# Patient Record
Sex: MALE | Race: WHITE | NOT HISPANIC OR LATINO | Employment: FULL TIME | URBAN - METROPOLITAN AREA
[De-identification: names, ages, dates, MRNs, and addresses within clinical notes are randomized per-mention and may not be internally consistent; named-entity substitution may affect disease eponyms.]

---

## 2017-01-24 ENCOUNTER — ALLSCRIPTS OFFICE VISIT (OUTPATIENT)
Dept: OTHER | Facility: OTHER | Age: 42
End: 2017-01-24

## 2017-01-24 LAB
BILIRUB UR QL STRIP: NEGATIVE
CLARITY UR: NORMAL
COLOR UR: YELLOW
GLUCOSE (HISTORICAL): NEGATIVE
HGB UR QL STRIP.AUTO: NEGATIVE
KETONES UR STRIP-MCNC: NEGATIVE MG/DL
LEUKOCYTE ESTERASE UR QL STRIP: NEGATIVE
NITRITE UR QL STRIP: NEGATIVE
PH UR STRIP.AUTO: 7.5 [PH]
PROT UR STRIP-MCNC: NEGATIVE MG/DL
SP GR UR STRIP.AUTO: 1.02
UROBILINOGEN UR QL STRIP.AUTO: 0.2

## 2017-03-17 ENCOUNTER — GENERIC CONVERSION - ENCOUNTER (OUTPATIENT)
Dept: OTHER | Facility: OTHER | Age: 42
End: 2017-03-17

## 2017-06-15 ENCOUNTER — ALLSCRIPTS OFFICE VISIT (OUTPATIENT)
Dept: OTHER | Facility: OTHER | Age: 42
End: 2017-06-15

## 2018-01-12 VITALS
SYSTOLIC BLOOD PRESSURE: 116 MMHG | HEIGHT: 75 IN | WEIGHT: 235.38 LBS | DIASTOLIC BLOOD PRESSURE: 78 MMHG | HEART RATE: 81 BPM | OXYGEN SATURATION: 99 % | TEMPERATURE: 99.1 F | BODY MASS INDEX: 29.27 KG/M2

## 2018-01-14 VITALS
HEIGHT: 74 IN | BODY MASS INDEX: 29.44 KG/M2 | SYSTOLIC BLOOD PRESSURE: 110 MMHG | HEART RATE: 63 BPM | DIASTOLIC BLOOD PRESSURE: 78 MMHG | WEIGHT: 229.44 LBS

## 2018-03-12 DIAGNOSIS — I10 ESSENTIAL HYPERTENSION: Primary | ICD-10-CM

## 2018-03-19 RX ORDER — AMLODIPINE BESYLATE 5 MG/1
TABLET ORAL
Qty: 90 TABLET | Refills: 1 | Status: SHIPPED | OUTPATIENT
Start: 2018-03-19 | End: 2018-09-10 | Stop reason: SDUPTHER

## 2018-04-10 ENCOUNTER — OFFICE VISIT (OUTPATIENT)
Dept: INTERNAL MEDICINE CLINIC | Facility: CLINIC | Age: 43
End: 2018-04-10
Payer: COMMERCIAL

## 2018-04-10 VITALS
HEIGHT: 75 IN | WEIGHT: 234.4 LBS | SYSTOLIC BLOOD PRESSURE: 112 MMHG | TEMPERATURE: 98.5 F | OXYGEN SATURATION: 98 % | HEART RATE: 75 BPM | BODY MASS INDEX: 29.14 KG/M2 | DIASTOLIC BLOOD PRESSURE: 70 MMHG

## 2018-04-10 DIAGNOSIS — B00.1 HERPES LABIALIS: Primary | ICD-10-CM

## 2018-04-10 DIAGNOSIS — J00 COMMON COLD: ICD-10-CM

## 2018-04-10 PROBLEM — R35.0 INCREASED FREQUENCY OF URINATION: Status: ACTIVE | Noted: 2017-01-24

## 2018-04-10 PROCEDURE — 99213 OFFICE O/P EST LOW 20 MIN: CPT | Performed by: NURSE PRACTITIONER

## 2018-04-10 RX ORDER — VALACYCLOVIR HYDROCHLORIDE 500 MG/1
500 TABLET, FILM COATED ORAL DAILY
COMMUNITY
End: 2018-04-10 | Stop reason: SDUPTHER

## 2018-04-10 RX ORDER — FLUTICASONE PROPIONATE 50 MCG
1 SPRAY, SUSPENSION (ML) NASAL DAILY
Qty: 16 G | Refills: 0 | Status: SHIPPED | OUTPATIENT
Start: 2018-04-10 | End: 2020-08-17

## 2018-04-10 RX ORDER — VALACYCLOVIR HYDROCHLORIDE 1 G/1
1000 TABLET, FILM COATED ORAL 2 TIMES DAILY
Qty: 30 TABLET | Refills: 0 | Status: SHIPPED | OUTPATIENT
Start: 2018-04-10 | End: 2019-12-01 | Stop reason: SDUPTHER

## 2018-04-10 NOTE — PATIENT INSTRUCTIONS
Start flonase and use 1 spray each nostril daily  You can continue OTC cold medication  Monitor Blood pressure  If elevated switch to Mucinex Dm as needed for cough and congestion  Drink plenty water   If not improving in 5-7 days we would like to see you back in the office, or for fever >101

## 2018-04-10 NOTE — PROGRESS NOTES
Assessment/Plan:       Diagnoses and all orders for this visit:    Herpes labialis  -     valACYclovir (VALTREX) 1,000 mg tablet; Take 1 tablet (1,000 mg total) by mouth 2 (two) times a day for 30 days    Common cold  -     fluticasone (FLONASE) 50 mcg/act nasal spray; 1 spray into each nostril daily        -     Advise he can continue his over-the-counter cold medication but also advised to use caution with nasal decongestants  No indication for antibiotics today  Instructed to drink plenty of fluids and use team or hot showers to help with congestion  Advised to follow up if no improvement in about 7 days or if he spikes a fever greater than 101  Subjective:      Patient ID: Ron Sanders is a 38 y o  male  HPI     Patient is here today complaining of cold symptoms x 2 days  Symptoms include headache, sinus congestion, sore throat that improved today, lethargic, and dry cough  Last night he was bringing up yellow mucous and he has been having thick yellow drainage from his sinuses  He denies fever or chills  He has been using [facility] cold, flu and sore throat  Sick contacts include his 2 sons 9and 6years old  They are on abx for strep throat and ear infections  The following portions of the patient's history were reviewed and updated as appropriate: allergies, current medications, past family history, past medical history, past social history, past surgical history and problem list     Review of Systems   Constitutional: Positive for activity change ("run down") and fatigue  Negative for appetite change, chills, diaphoresis and fever  HENT: Positive for congestion, postnasal drip, rhinorrhea, sneezing and sore throat  Negative for ear discharge, ear pain, sinus pain, sinus pressure and trouble swallowing  Respiratory: Positive for cough  Negative for chest tightness, shortness of breath and wheezing  Cardiovascular: Negative for chest pain, palpitations and leg swelling     Neurological: Positive for headaches (pressure headache)  Negative for dizziness, syncope and light-headedness  Past Medical History:   Diagnosis Date    Hypertension 3/23/2015         Current Outpatient Prescriptions:     amLODIPine (NORVASC) 5 mg tablet, TAKE 1 TABLET DAILY, Disp: 90 tablet, Rfl: 1    valACYclovir (VALTREX) 500 mg tablet, Take 500 mg by mouth daily, Disp: , Rfl:     Allergies   Allergen Reactions    Amoxicillin     Penicillins        Social History   Past Surgical History:   Procedure Laterality Date    APPENDECTOMY      WISDOM TOOTH EXTRACTION       No family history on file  Objective:  /70 (BP Location: Left arm, Patient Position: Sitting, Cuff Size: Large)   Pulse 75   Temp 98 5 °F (36 9 °C) (Oral)   Ht 6' 2 5" (1 892 m)   Wt 106 kg (234 lb 6 4 oz)   SpO2 98%   BMI 29 69 kg/m²      Physical Exam   Constitutional: He is oriented to person, place, and time  He appears well-developed and well-nourished  No distress  HENT:   Head: Normocephalic and atraumatic  Right Ear: Tympanic membrane and external ear normal    Left Ear: Tympanic membrane and external ear normal    Nose: Rhinorrhea present  No mucosal edema  Right sinus exhibits no maxillary sinus tenderness and no frontal sinus tenderness  Left sinus exhibits no maxillary sinus tenderness and no frontal sinus tenderness  Mouth/Throat: Oropharynx is clear and moist and mucous membranes are normal  No oropharyngeal exudate, posterior oropharyngeal edema or posterior oropharyngeal erythema  Patient sounds nasally congested on exam   pale moist nasal mucous membranes   Eyes: Conjunctivae and EOM are normal  Pupils are equal, round, and reactive to light  Neck: Normal range of motion  Neck supple  Cardiovascular: Normal rate, regular rhythm and normal heart sounds  No murmur heard  Pulmonary/Chest: Effort normal and breath sounds normal  No respiratory distress  He has no decreased breath sounds  He has no wheezes  He has no rhonchi  Musculoskeletal: He exhibits no edema  Lymphadenopathy:     He has no cervical adenopathy  Neurological: He is alert and oriented to person, place, and time  Skin: Skin is warm and dry  He is not diaphoretic  Psychiatric: He has a normal mood and affect  His behavior is normal    Vitals reviewed

## 2018-05-07 DIAGNOSIS — B00.1 HERPES LABIALIS: ICD-10-CM

## 2018-05-08 RX ORDER — VALACYCLOVIR HYDROCHLORIDE 1 G/1
1000 TABLET, FILM COATED ORAL 2 TIMES DAILY
Qty: 60 TABLET | Refills: 0 | OUTPATIENT
Start: 2018-05-08 | End: 2018-06-07

## 2018-08-14 ENCOUNTER — OFFICE VISIT (OUTPATIENT)
Dept: INTERNAL MEDICINE CLINIC | Facility: CLINIC | Age: 43
End: 2018-08-14
Payer: COMMERCIAL

## 2018-08-14 VITALS
SYSTOLIC BLOOD PRESSURE: 110 MMHG | WEIGHT: 229 LBS | DIASTOLIC BLOOD PRESSURE: 76 MMHG | HEART RATE: 84 BPM | TEMPERATURE: 98.7 F | BODY MASS INDEX: 28.47 KG/M2 | HEIGHT: 75 IN | OXYGEN SATURATION: 97 %

## 2018-08-14 DIAGNOSIS — B36.0 TINEA VERSICOLOR: ICD-10-CM

## 2018-08-14 DIAGNOSIS — Z13.220 SCREENING CHOLESTEROL LEVEL: ICD-10-CM

## 2018-08-14 DIAGNOSIS — R35.0 URINARY FREQUENCY: ICD-10-CM

## 2018-08-14 DIAGNOSIS — R53.83 OTHER FATIGUE: Primary | ICD-10-CM

## 2018-08-14 PROBLEM — J00 COMMON COLD: Status: RESOLVED | Noted: 2017-01-24 | Resolved: 2018-08-14

## 2018-08-14 PROCEDURE — 99214 OFFICE O/P EST MOD 30 MIN: CPT | Performed by: FAMILY MEDICINE

## 2018-08-14 RX ORDER — FLUCONAZOLE 100 MG/1
100 TABLET ORAL DAILY
Qty: 5 TABLET | Refills: 1 | Status: SHIPPED | OUTPATIENT
Start: 2018-08-14 | End: 2018-08-19

## 2018-08-14 RX ORDER — KETOCONAZOLE 20 MG/G
CREAM TOPICAL DAILY
Qty: 45 G | Refills: 3 | Status: SHIPPED | OUTPATIENT
Start: 2018-08-14 | End: 2020-08-17

## 2018-08-14 NOTE — PROGRESS NOTES
Assessment/Plan:    No problem-specific Assessment & Plan notes found for this encounter  Problem List Items Addressed This Visit        Musculoskeletal and Integument    Tinea versicolor       Other    Other fatigue - Primary    Relevant Orders    CBC and differential    Comprehensive metabolic panel    Lyme Antibody Profile with reflex to WB    TSH baseline    Screening cholesterol level    Relevant Orders    Lipid panel      Other Visit Diagnoses     Urinary frequency        Relevant Orders    UA w Reflex to Microscopic w Reflex to Culture -Lab Collect            Follows with cardio yearly, stable    Subjective:  fatigue     Patient ID: Stephanie Lima is a 38 y o  male  HPI       would like labs  Done for fatigue  C/o tired, less motivated, sleeping well, it a teacher, works in the summer also, does outdoor work a lot and has found ticks on him  + fam hx of htn, hld and dm  Stopped working out due to picking his kinds from the bus stop eating a drinking ok, drinks coffee and alcohol daily  Feels restless at night with sleeping  Snores on his back- no gasping      Htn: follows with cardio, on norvasc, stable with medication    The following portions of the patient's history were reviewed and updated as appropriate: allergies, current medications, past family history, past medical history, past social history, past surgical history and problem list     Review of Systems    Constitutional:  Denies fever or chills   Eyes:  Denies change in visual acuity   HENT:  Denies nasal congestion or sore throat   Respiratory:  Denies cough or shortness of breath or wheezing  Cardiovascular:  Denies palpitations or chest pain  GI:  Denies abdominal pain, nausea, or vomiting  Integument:  Denies rash   Neurologic:  Denies headache or focal weakness        Objective:      /76 (BP Location: Left arm, Patient Position: Sitting, Cuff Size: Standard)   Pulse 84   Temp 98 7 °F (37 1 °C) (Oral)   Ht 6' 2 5" (1 892 m) Wt 104 kg (229 lb)   SpO2 97%   BMI 29 01 kg/m²          Physical Exam    Constitutional:  Well developed, well nourished, no acute distress, non-toxic appearance   Eyes:  PERRL, conjunctiva normal , non icteric sclera  HENT:  Atraumatic, oropharynx moist  Neck-  supple   Respiratory:  CTA b/l, normal breath sounds, no rales, no wheezing   Cardiovascular:  RRR, no murmurs, no LE edema b/l  GI:  Soft, nondistended, normal bowel sounds x 4, nontender, no organomegaly, no mass, no rebound, no guarding   Neurologic:  no focal deficits noted   Psychiatric:  Speech and behavior appropriate , AAO x 3  SKIN:  Tinea versicolor on back

## 2018-08-15 ENCOUNTER — CLINICAL SUPPORT (OUTPATIENT)
Dept: INTERNAL MEDICINE CLINIC | Facility: CLINIC | Age: 43
End: 2018-08-15
Payer: COMMERCIAL

## 2018-08-15 DIAGNOSIS — Z13.220 SCREENING CHOLESTEROL LEVEL: ICD-10-CM

## 2018-08-15 DIAGNOSIS — R53.83 OTHER FATIGUE: Primary | ICD-10-CM

## 2018-08-15 DIAGNOSIS — R35.0 URINARY FREQUENCY: ICD-10-CM

## 2018-08-15 LAB
ALBUMIN SERPL BCP-MCNC: 4.2 G/DL (ref 3.5–5)
ALP SERPL-CCNC: 57 U/L (ref 46–116)
ALT SERPL W P-5'-P-CCNC: 30 U/L (ref 12–78)
ANION GAP SERPL CALCULATED.3IONS-SCNC: 5 MMOL/L (ref 4–13)
AST SERPL W P-5'-P-CCNC: 18 U/L (ref 5–45)
BASOPHILS # BLD AUTO: 0.03 THOUSANDS/ΜL (ref 0–0.1)
BASOPHILS NFR BLD AUTO: 1 % (ref 0–1)
BILIRUB SERPL-MCNC: 0.8 MG/DL (ref 0.2–1)
BILIRUB UR QL STRIP: NEGATIVE
BUN SERPL-MCNC: 15 MG/DL (ref 5–25)
CALCIUM SERPL-MCNC: 9.1 MG/DL (ref 8.3–10.1)
CHLORIDE SERPL-SCNC: 105 MMOL/L (ref 100–108)
CHOLEST SERPL-MCNC: 213 MG/DL (ref 50–200)
CLARITY UR: CLEAR
CO2 SERPL-SCNC: 28 MMOL/L (ref 21–32)
COLOR UR: NORMAL
CREAT SERPL-MCNC: 1.02 MG/DL (ref 0.6–1.3)
EOSINOPHIL # BLD AUTO: 0.17 THOUSAND/ΜL (ref 0–0.61)
EOSINOPHIL NFR BLD AUTO: 4 % (ref 0–6)
ERYTHROCYTE [DISTWIDTH] IN BLOOD BY AUTOMATED COUNT: 12.3 % (ref 11.6–15.1)
GFR SERPL CREATININE-BSD FRML MDRD: 92 ML/MIN/1.73SQ M
GLUCOSE P FAST SERPL-MCNC: 96 MG/DL (ref 65–99)
GLUCOSE UR STRIP-MCNC: NEGATIVE MG/DL
HCT VFR BLD AUTO: 44.5 % (ref 36.5–49.3)
HDLC SERPL-MCNC: 59 MG/DL (ref 40–60)
HGB BLD-MCNC: 14.4 G/DL (ref 12–17)
HGB UR QL STRIP.AUTO: NEGATIVE
IMM GRANULOCYTES # BLD AUTO: 0.01 THOUSAND/UL (ref 0–0.2)
IMM GRANULOCYTES NFR BLD AUTO: 0 % (ref 0–2)
KETONES UR STRIP-MCNC: NEGATIVE MG/DL
LDLC SERPL CALC-MCNC: 130 MG/DL (ref 0–100)
LEUKOCYTE ESTERASE UR QL STRIP: NEGATIVE
LYMPHOCYTES # BLD AUTO: 1.57 THOUSANDS/ΜL (ref 0.6–4.47)
LYMPHOCYTES NFR BLD AUTO: 33 % (ref 14–44)
MCH RBC QN AUTO: 29.3 PG (ref 26.8–34.3)
MCHC RBC AUTO-ENTMCNC: 32.4 G/DL (ref 31.4–37.4)
MCV RBC AUTO: 90 FL (ref 82–98)
MONOCYTES # BLD AUTO: 0.58 THOUSAND/ΜL (ref 0.17–1.22)
MONOCYTES NFR BLD AUTO: 12 % (ref 4–12)
NEUTROPHILS # BLD AUTO: 2.43 THOUSANDS/ΜL (ref 1.85–7.62)
NEUTS SEG NFR BLD AUTO: 50 % (ref 43–75)
NITRITE UR QL STRIP: NEGATIVE
NONHDLC SERPL-MCNC: 154 MG/DL
NRBC BLD AUTO-RTO: 0 /100 WBCS
PH UR STRIP.AUTO: 6 [PH] (ref 4.5–8)
PLATELET # BLD AUTO: 196 THOUSANDS/UL (ref 149–390)
PMV BLD AUTO: 10.9 FL (ref 8.9–12.7)
POTASSIUM SERPL-SCNC: 4.6 MMOL/L (ref 3.5–5.3)
PROT SERPL-MCNC: 7.5 G/DL (ref 6.4–8.2)
PROT UR STRIP-MCNC: NEGATIVE MG/DL
RBC # BLD AUTO: 4.92 MILLION/UL (ref 3.88–5.62)
SODIUM SERPL-SCNC: 138 MMOL/L (ref 136–145)
SP GR UR STRIP.AUTO: 1.03 (ref 1–1.03)
TRIGL SERPL-MCNC: 118 MG/DL
TSH SERPL DL<=0.05 MIU/L-ACNC: 1.87 UIU/ML
UROBILINOGEN UR QL STRIP.AUTO: 0.2 E.U./DL
WBC # BLD AUTO: 4.79 THOUSAND/UL (ref 4.31–10.16)

## 2018-08-15 PROCEDURE — 81003 URINALYSIS AUTO W/O SCOPE: CPT | Performed by: FAMILY MEDICINE

## 2018-08-15 PROCEDURE — 80061 LIPID PANEL: CPT | Performed by: FAMILY MEDICINE

## 2018-08-15 PROCEDURE — 85025 COMPLETE CBC W/AUTO DIFF WBC: CPT | Performed by: FAMILY MEDICINE

## 2018-08-15 PROCEDURE — 84443 ASSAY THYROID STIM HORMONE: CPT | Performed by: FAMILY MEDICINE

## 2018-08-15 PROCEDURE — 80053 COMPREHEN METABOLIC PANEL: CPT | Performed by: FAMILY MEDICINE

## 2018-08-15 PROCEDURE — 36415 COLL VENOUS BLD VENIPUNCTURE: CPT

## 2018-08-15 PROCEDURE — 86618 LYME DISEASE ANTIBODY: CPT | Performed by: FAMILY MEDICINE

## 2018-08-16 LAB
B BURGDOR IGG SER IA-ACNC: 0.16
B BURGDOR IGM SER IA-ACNC: 0.26

## 2018-08-20 ENCOUNTER — TELEPHONE (OUTPATIENT)
Dept: INTERNAL MEDICINE CLINIC | Facility: CLINIC | Age: 43
End: 2018-08-20

## 2018-08-20 NOTE — TELEPHONE ENCOUNTER
Patient calling to review the labs that were completed      Please call [phone] to discuss    Thank you

## 2018-09-10 DIAGNOSIS — I10 ESSENTIAL HYPERTENSION: ICD-10-CM

## 2018-09-10 RX ORDER — AMLODIPINE BESYLATE 5 MG/1
TABLET ORAL
Qty: 90 TABLET | Refills: 1 | Status: SHIPPED | OUTPATIENT
Start: 2018-09-10 | End: 2019-03-09 | Stop reason: SDUPTHER

## 2019-03-09 DIAGNOSIS — I10 ESSENTIAL HYPERTENSION: ICD-10-CM

## 2019-03-10 RX ORDER — AMLODIPINE BESYLATE 5 MG/1
TABLET ORAL
Qty: 90 TABLET | Refills: 1 | Status: SHIPPED | OUTPATIENT
Start: 2019-03-10 | End: 2019-09-06 | Stop reason: SDUPTHER

## 2019-05-12 DIAGNOSIS — B00.1 HERPES LABIALIS: ICD-10-CM

## 2019-05-12 RX ORDER — VALACYCLOVIR HYDROCHLORIDE 1 G/1
1000 TABLET, FILM COATED ORAL 2 TIMES DAILY
Qty: 60 TABLET | Refills: 0 | OUTPATIENT
Start: 2019-05-12 | End: 2019-06-11

## 2019-07-12 DIAGNOSIS — J00 COMMON COLD: ICD-10-CM

## 2019-07-12 DIAGNOSIS — B00.1 HERPES LABIALIS: ICD-10-CM

## 2019-07-15 RX ORDER — VALACYCLOVIR HYDROCHLORIDE 1 G/1
1000 TABLET, FILM COATED ORAL 2 TIMES DAILY
Qty: 60 TABLET | Refills: 0 | OUTPATIENT
Start: 2019-07-15 | End: 2019-08-14

## 2019-07-15 RX ORDER — FLUTICASONE PROPIONATE 50 MCG
SPRAY, SUSPENSION (ML) NASAL
Qty: 16 ML | Refills: 0 | OUTPATIENT
Start: 2019-07-15

## 2019-07-16 ENCOUNTER — OFFICE VISIT (OUTPATIENT)
Dept: CARDIOLOGY CLINIC | Facility: CLINIC | Age: 44
End: 2019-07-16
Payer: COMMERCIAL

## 2019-07-16 VITALS
HEART RATE: 68 BPM | DIASTOLIC BLOOD PRESSURE: 76 MMHG | WEIGHT: 224.5 LBS | BODY MASS INDEX: 28.81 KG/M2 | SYSTOLIC BLOOD PRESSURE: 120 MMHG | HEIGHT: 74 IN

## 2019-07-16 DIAGNOSIS — I10 ESSENTIAL HYPERTENSION: Primary | ICD-10-CM

## 2019-07-16 PROCEDURE — 93000 ELECTROCARDIOGRAM COMPLETE: CPT | Performed by: INTERNAL MEDICINE

## 2019-07-16 PROCEDURE — 99213 OFFICE O/P EST LOW 20 MIN: CPT | Performed by: INTERNAL MEDICINE

## 2019-07-16 PROCEDURE — 3074F SYST BP LT 130 MM HG: CPT | Performed by: INTERNAL MEDICINE

## 2019-07-16 NOTE — PROGRESS NOTES
Cardiology Follow Up    Waldemar Poon  1975  [id number]  [facility]  [address] [facility] [address]  [place] [id number] [phone]    1  Essential hypertension  POCT ECG       Interval History:   Cardiology follow-up  Patient doing well from a cardiac point of view denies any chest pain or dyspnea, very active but no regular exercise, significant stress of working on personal life, overall he seems to be management for the most part well  Compliant with low-sodium diet, blood pressures been well control  He admits to smoking marijuana once or twice a week      Patient Active Problem List   Diagnosis    Back muscle spasm    Flank pain    Large liver    Hypertension    Impacted cerumen of left ear    Increased frequency of urination    Herpes labialis    Other fatigue    Screening cholesterol level    Tinea versicolor     Past Medical History:   Diagnosis Date    Hypertension 3/23/2015     Social History     Socioeconomic History    Marital status: /Civil Union     Spouse name: Not on file    Number of children: Not on file    Years of education: Not on file    Highest education level: Not on file   Occupational History    Not on file   Social Needs    Financial resource strain: Not on file    Food insecurity:     Worry: Not on file     Inability: Not on file    Transportation needs:     Medical: Not on file     Non-medical: Not on file   Tobacco Use    Smoking status: Current Some Day Smoker    Smokeless tobacco: Current User    Tobacco comment: cigar only   Substance and Sexual Activity    Alcohol use: Yes     Comment: social    Drug use: No    Sexual activity: Not on file   Lifestyle    Physical activity:     Days per week: Not on file     Minutes per session: Not on file    Stress: Not on file   Relationships    Social connections:     Talks on phone: Not on file     Gets together: Not on file     Attends Anabaptist service: Not on file     Active member of club or organization: Not on file     Attends meetings of clubs or organizations: Not on file     Relationship status: Not on file    Intimate partner violence:     Fear of current or ex partner: Not on file     Emotionally abused: Not on file     Physically abused: Not on file     Forced sexual activity: Not on file   Other Topics Concern    Not on file   Social History Narrative    Not on file      Family History   Problem Relation Age of Onset    Hypertension Mother     Diabetes Maternal Grandfather      Past Surgical History:   Procedure Laterality Date    APPENDECTOMY      WISDOM TOOTH EXTRACTION         Current Outpatient Medications:     amLODIPine (NORVASC) 5 mg tablet, TAKE 1 TABLET DAILY, Disp: 90 tablet, Rfl: 1    fluticasone (FLONASE) 50 mcg/act nasal spray, 1 spray into each nostril daily (Patient taking differently: 1 spray into each nostril as needed ), Disp: 16 g, Rfl: 0    valACYclovir (VALTREX) 1,000 mg tablet, Take 1 tablet (1,000 mg total) by mouth 2 (two) times a day for 30 days (Patient taking differently: Take 1,000 mg by mouth as needed ), Disp: 30 tablet, Rfl: 0    ketoconazole (NIZORAL) 2 % cream, Apply topically daily, Disp: 45 g, Rfl: 3  Allergies   Allergen Reactions    Amoxicillin     Penicillins        Labs:  No visits with results within 6 Month(s) from this visit     Latest known visit with results is:   Clinical Support on 08/15/2018   Component Date Value    TSH Baseline 08/15/2018 1 870     LYME AB IGG 08/15/2018 0 16     LYME AB IGM 08/15/2018 0 26     Cholesterol 08/15/2018 213*    Triglycerides 08/15/2018 118     HDL, Direct 08/15/2018 59     LDL Calculated 08/15/2018 130*    Non-HDL-Chol (CHOL-HDL) 08/15/2018 154     Sodium 08/15/2018 138     Potassium 08/15/2018 4 6     Chloride 08/15/2018 105     CO2 08/15/2018 28     ANION GAP 08/15/2018 5     BUN 08/15/2018 15     Creatinine 08/15/2018 1 02     Glucose, Fasting 08/15/2018 96     Calcium 08/15/2018 9 1     AST 08/15/2018 18     ALT 08/15/2018 30     Alkaline Phosphatase 08/15/2018 57     Total Protein 08/15/2018 7 5     Albumin 08/15/2018 4 2     Total Bilirubin 08/15/2018 0 80     eGFR 08/15/2018 92     WBC 08/15/2018 4 79     RBC 08/15/2018 4 92     Hemoglobin 08/15/2018 14 4     Hematocrit 08/15/2018 44 5     MCV 08/15/2018 90     MCH 08/15/2018 29 3     MCHC 08/15/2018 32 4     RDW 08/15/2018 12 3     MPV 08/15/2018 10 9     Platelets 75/44/1890 196     nRBC 08/15/2018 0     Neutrophils Relative 08/15/2018 50     Immat GRANS % 08/15/2018 0     Lymphocytes Relative 08/15/2018 33     Monocytes Relative 08/15/2018 12     Eosinophils Relative 08/15/2018 4     Basophils Relative 08/15/2018 1     Neutrophils Absolute 08/15/2018 2 43     Immature Grans Absolute 08/15/2018 0 01     Lymphocytes Absolute 08/15/2018 1 57     Monocytes Absolute 08/15/2018 0 58     Eosinophils Absolute 08/15/2018 0 17     Basophils Absolute 08/15/2018 0 03     Color, UA 08/15/2018 Dk Yellow     Clarity, UA 08/15/2018 Clear     Specific Gravity, UA 08/15/2018 1 026     pH, UA 08/15/2018 6 0     Leukocytes, UA 08/15/2018 Negative     Nitrite, UA 08/15/2018 Negative     Protein, UA 08/15/2018 Negative     Glucose, UA 08/15/2018 Negative     Ketones, UA 08/15/2018 Negative     Urobilinogen, UA 08/15/2018 0 2     Bilirubin, UA 08/15/2018 Negative     Blood, UA 08/15/2018 Negative      Imaging: No results found  Review of Systems:  Review of Systems   Constitutional: Negative for activity change and fatigue  Eyes: Negative for visual disturbance  Respiratory: Negative for apnea, shortness of breath, wheezing and stridor  Cardiovascular: Negative for chest pain, palpitations and leg swelling  Neurological: Negative for syncope  Hematological: Does not bruise/bleed easily     Psychiatric/Behavioral: Negative for sleep disturbance  The patient is nervous/anxious  Physical Exam:  Physical Exam   Constitutional: He appears well-developed and well-nourished  No distress  Eyes: No scleral icterus  Neck: No JVD present  Cardiovascular: Normal rate, regular rhythm, normal heart sounds and intact distal pulses  Exam reveals no gallop and no friction rub  No murmur heard  Pulmonary/Chest: Effort normal and breath sounds normal  No stridor  No respiratory distress  He has no wheezes  He has no rales  Musculoskeletal: He exhibits no edema  Neurological: He is alert  Skin: Skin is warm  Capillary refill takes less than 2 seconds  He is not diaphoretic  Psychiatric: He has a normal mood and affect  Discussion/Summary:  Hypertension, well controlled current medical regimen  EKG today is normal   Stress test and echocardiogram 4 years ago remarkable for renal artery duplex was also normal   Regular exercise stress management and smoking reduction recommended  This note was completed in part utilizing [employer] direct voice recognition software  Grammatical errors, random word insertion, spelling mistakes, and incomplete sentences may be an occasional consequence of the system secondary to software limitations, ambient noise and hardware issues  At the time of dictation, efforts were made to edit, clarify and /or correct errors  Please read the chart carefully and recognize, using context, where substitutions have occurred  If you have any questions or concerns about the context, text or information contained within the body of this dictation, please contact myself, the provider, for further clarification

## 2019-08-03 ENCOUNTER — TELEPHONE (OUTPATIENT)
Dept: OTHER | Facility: OTHER | Age: 44
End: 2019-08-03

## 2019-08-03 DIAGNOSIS — B00.1 HERPES LABIALIS: ICD-10-CM

## 2019-08-03 NOTE — TELEPHONE ENCOUNTER
Modesta Gutiérrez 1975  CONFIDENTIALTY NOTICE: This fax transmission is intended only for the addressee  It contains information that is legally privileged,  confidential or otherwise protected from use or disclosure  If you are not the intended recipient, you are strictly prohibited from reviewing,  disclosing, copying using or disseminating any of this information or taking any action in reliance on or regarding this information  If you have  received this fax in error, please notify us immediately by telephone so that we can arrange for its return to us  Page: 1 of 3  Call Id: [id number]  Health Call  Standard Call Report  Health Call  Patient Name: Modesta Gutiérrez  Gender: Male  : 1975  Age: 41 Y 8 M 29 D  Return Phone  Number: [phone] (Home)  Address: [address]   City/State/[facility]: [address] [place] [address]  Practice Name: [address] [place] [address] Avenue:  Physician:  [address] [place] [address] Name:  Relationship To  Patient: Self  Return Phone Number: [phone] (Home)  Presenting Problem: "I have a bad blister on my lip "  Service Type: Triage  Charged Service 1: Abida Hinson U  38  Name and  Number:  Nurse Assessment  Nurse: Sharifa Gorman Date/Time: 8/3/2019 8:45:47 AM  Type of assessment required:  ---General (Adult or Child)  Duration of Current S/S  ---Started 2 - 3 days ago  Location/Radiation  ---Bottom left lip  Temperature (F) and route:  ---Denies fever  Symptom Specific Meds (Dose/Time):  ---None  Pt  requesting Valtrex  Other S/S  ---Patient believed at first that he had a "sun blister " That was the size of a pencil eraser  but did improve  Now notice that he has "the beginning of a real cold sore " Patient  does feel tingling and keeps wanting to lick his lips  Denies spreading redness around  the blister and it has not broken at this time    Pain Scale on scale of 1-10, 10 being the worst:  ---"Annoying feeling "  Symptom progression:  ---worse  Intake and Output  Modesta Gutiérrez 1975  CONFIDENTIALTY NOTICE: This fax transmission is intended only for the addressee  It contains information that is legally privileged,  confidential or otherwise protected from use or disclosure  If you are not the intended recipient, you are strictly prohibited from reviewing,  disclosing, copying using or disseminating any of this information or taking any action in reliance on or regarding this information  If you have  received this fax in error, please notify us immediately by telephone so that we can arrange for its return to us  Page: 2 of 3  Call Id: [id number]  Nurse Assessment  ---WNL  Last Exam/Treatment:  ---08- for well check  Protocols  Protocol Title Nurse Date/Time  Cold Sores (Fever Blisters) Juanpablo Biju 8/3/2019 8:47:58 AM  Question Caller Affirmed  Disp  Time Disposition Final User  8/3/2019 9:04:35 AM Call PCP when Office is Open Ariella Lino RN, Fernando Sherman  8/3/2019 9:08:25 AM RN Triaged Yes Ariella Lino RN, Fernando Sherman  Disposition Overriden: See PCP within 2 Weeks  Override Reason: Specify reason  (Please document in 'advice recommended' section)  Care Advice Given Per Protocol  SEE PCP WITHIN 2 WEEKS: * You need an evaluation for this ongoing problem within the next 2 weeks  Call your doctor during  regular office hours and make an appointment  PRESCRIPTION MEDICATIONS FOR COLD SORES: * Cold sores occur on one side  of the outer lip  They are a recurrent problem in 20% of normal adults  A cold sore usually lasts about 7-10 days  * There is no cure for  cold sores  * However, there are prescription anti-viral medications that can reduce the pain and decrease the duration by about 2 days  OTC DOCOSANOL 10% CREAM: * Apply OTC docosanol cream (trade name Abreva) to the cold sore 5 times daily until healing  occurs  Begin using this cream as soon as you first sense the beginning of an outbreak  * Docosanol can reduce the pain and shorten  the course by a day or two   * Read the package instructions  PAIN MEDICINES: * For pain relief, take acetaminophen, ibuprofen, or  naproxen  * Use the lowest amount that makes your pain feel better  ACETAMINOPHEN (E G , TYLENOL): * Take 650 mg (two  325 mg pills) by mouth every 4-6 hours as needed  Each Regular Strength Tylenol pill has 325 mg of acetaminophen  The most you  should take each day is 3,250 mg (10 Regular Strength pills a day)  * Another choice is to take 1,000 mg (two 500 mg pills) every 8  hours as needed  Each Extra Strength Tylenol pill has 500 mg of acetaminophen  The most you should take each day is 3,000 mg (6 Extra  Strength pills a day)  EXTRA NOTES: * Acetaminophen is thought to be safer than ibuprofen or naproxen for people over 72years old  Acetaminophen is in many OTC and prescription medicines  It might be in more than one medicine that you are taking  You need to be  careful and not take an overdose  An acetaminophen overdose can hurt the liver  [employer], the company that makes Tylenol, has different  dosage instructions for Tylenol in [place] and the [facility]  In [place] the maximum recommended dose per day is 4,000 mg or  twelve (12) Regular-Strength (325 mg) pills  In the [place], [place] recommends a maximum dose of ten (10) Regular-Strength  (325 mg) pills  * Before taking any medicine, read all the instructions on the package  CONTAGIOUSNESS: * SPREAD: Herpes from  cold sores is contagious to other people  Discourage picking or rubbing the sore  Don't open the blisters  Wash your hands frequently  The cold sores are contagious until dry (5-7 days)  Most cold sore sufferers note a tingling in the lip before the sore appears (prodromal  phase)  Patients are also contagious during this time period  * EYES: Avoid spreading the virus to someone's eye by kissing or touching;  an eye infection can be serious (herpes keratitis)   * MOUTH: Since the blisters and mouth secretions are contagious, avoid kissing other  people during this time  Avoid sharing drinking glasses, eating utensils, or razors  * SEX: Avoid oral sex during this time  Herpes from  sores on your mouth can spread to your partner's genital area  * CONTACT WITH IMMUNOSUPPRESSED PEOPLE: Avoid contact  with anyone who has eczema or a weakened immune system  PREVENTION: Since cold sores are often triggered by exposure to intense  sunlight, use a lip balm containing a sunscreen (SPF 30 or higher)  EXPECTED COURSE: The pain typically subsides over 4-5 days  and the sores typically heal over a 7-10 day period  On average, patients note recurrences 2-3 times per year  [facility]  NATIONAL HERPES  HOTLINE: * Phone number: [phone]  * Counselors provide information about transmission, treatment, prevention, and emotional  issues  CALL BACK IF: * Sores look infected * Sores occur near or in the eye * Sores last over 2 weeks  CARE ADVICE given per  Fever Blisters of Lip (Cold Sores) (Adult) guideline  Sadia Jasso 1975  CONFIDENTIALTY NOTICE: This fax transmission is intended only for the addressee  It contains information that is legally privileged,  confidential or otherwise protected from use or disclosure  If you are not the intended recipient, you are strictly prohibited from reviewing,  disclosing, copying using or disseminating any of this information or taking any action in reliance on or regarding this information  If you have  received this fax in error, please notify us immediately by telephone so that we can arrange for its return to us  Page: 3 of 3  Call Id: [id number]  Caller Understands: Yes  Caller Disagree/Comply: Comply  PreDisposition: Unsure  Comments  User: Daniela Enciso RN Date/Time: 8/3/2019 9:08:07 AM  Explained to patient that office does not refill medications on the weekend  I will send a refill request to the office  Did advise  patient to call the office when open on Monday, patient verbalized understanding

## 2019-08-04 RX ORDER — VALACYCLOVIR HYDROCHLORIDE 1 G/1
1000 TABLET, FILM COATED ORAL 2 TIMES DAILY
Qty: 30 TABLET | Refills: 0 | OUTPATIENT
Start: 2019-08-04 | End: 2019-09-03

## 2019-08-05 NOTE — TELEPHONE ENCOUNTER
Asya Polk,    Why was this forwarded? Just wondering if there was anything special that needed to be done  Called pt  LMOM to call back to make appt since he called again about this over the weekend      Thanks,  Cleopatra

## 2019-09-06 DIAGNOSIS — I10 ESSENTIAL HYPERTENSION: ICD-10-CM

## 2019-09-09 RX ORDER — AMLODIPINE BESYLATE 5 MG/1
TABLET ORAL
Qty: 90 TABLET | Refills: 4 | Status: SHIPPED | OUTPATIENT
Start: 2019-09-09 | End: 2020-12-02

## 2019-12-01 DIAGNOSIS — B00.1 HERPES LABIALIS: ICD-10-CM

## 2019-12-02 RX ORDER — VALACYCLOVIR HYDROCHLORIDE 1 G/1
1000 TABLET, FILM COATED ORAL AS NEEDED
Qty: 30 TABLET | Refills: 0 | Status: SHIPPED | OUTPATIENT
Start: 2019-12-02 | End: 2020-01-20 | Stop reason: SDUPTHER

## 2020-01-20 ENCOUNTER — OFFICE VISIT (OUTPATIENT)
Dept: INTERNAL MEDICINE CLINIC | Facility: CLINIC | Age: 45
End: 2020-01-20
Payer: COMMERCIAL

## 2020-01-20 ENCOUNTER — HOSPITAL ENCOUNTER (OUTPATIENT)
Dept: RADIOLOGY | Facility: IMAGING CENTER | Age: 45
Discharge: HOME | End: 2020-01-20
Payer: COMMERCIAL

## 2020-01-20 VITALS
TEMPERATURE: 98.3 F | WEIGHT: 231 LBS | HEART RATE: 71 BPM | SYSTOLIC BLOOD PRESSURE: 118 MMHG | BODY MASS INDEX: 29.65 KG/M2 | HEIGHT: 74 IN | DIASTOLIC BLOOD PRESSURE: 78 MMHG | OXYGEN SATURATION: 98 %

## 2020-01-20 DIAGNOSIS — B00.1 HERPES LABIALIS: ICD-10-CM

## 2020-01-20 DIAGNOSIS — R39.13 INTERMITTENT URINARY STREAM: ICD-10-CM

## 2020-01-20 DIAGNOSIS — N50.89 SCROTAL MASS: Primary | ICD-10-CM

## 2020-01-20 DIAGNOSIS — N50.89 SCROTAL MASS: ICD-10-CM

## 2020-01-20 DIAGNOSIS — Z23 FLU VACCINE NEED: ICD-10-CM

## 2020-01-20 LAB
BILIRUB UR QL STRIP: NEGATIVE
CLARITY UR: CLEAR
COLOR UR: YELLOW
GLUCOSE UR STRIP-MCNC: NEGATIVE MG/DL
HGB UR QL STRIP.AUTO: NEGATIVE
KETONES UR STRIP-MCNC: NEGATIVE MG/DL
LEUKOCYTE ESTERASE UR QL STRIP: NEGATIVE
NITRITE UR QL STRIP: NEGATIVE
PH UR STRIP.AUTO: 6 [PH]
PROT UR STRIP-MCNC: NEGATIVE MG/DL
SL AMB  POCT GLUCOSE, UA: NORMAL
SL AMB LEUKOCYTE ESTERASE,UA: NORMAL
SL AMB POCT BILIRUBIN,UA: NORMAL
SL AMB POCT BLOOD,UA: NORMAL
SL AMB POCT CLARITY,UA: CLEAR
SL AMB POCT COLOR,UA: NORMAL
SL AMB POCT KETONES,UA: NORMAL
SL AMB POCT NITRITE,UA: NORMAL
SL AMB POCT PH,UA: 6
SL AMB POCT SPECIFIC GRAVITY,UA: 1.02
SL AMB POCT URINE PROTEIN: NORMAL
SL AMB POCT UROBILINOGEN: 0.2
SP GR UR STRIP.AUTO: 1.02 (ref 1–1.03)
UROBILINOGEN UR QL STRIP.AUTO: 0.2 E.U./DL

## 2020-01-20 PROCEDURE — 99214 OFFICE O/P EST MOD 30 MIN: CPT | Performed by: NURSE PRACTITIONER

## 2020-01-20 PROCEDURE — 76870 US EXAM SCROTUM: CPT

## 2020-01-20 PROCEDURE — 90471 IMMUNIZATION ADMIN: CPT

## 2020-01-20 PROCEDURE — 81003 URINALYSIS AUTO W/O SCOPE: CPT | Performed by: NURSE PRACTITIONER

## 2020-01-20 PROCEDURE — 90686 IIV4 VACC NO PRSV 0.5 ML IM: CPT

## 2020-01-20 PROCEDURE — 3008F BODY MASS INDEX DOCD: CPT | Performed by: NURSE PRACTITIONER

## 2020-01-20 RX ORDER — VALACYCLOVIR HYDROCHLORIDE 1 G/1
TABLET, FILM COATED ORAL
Qty: 4 TABLET | Refills: 0 | Status: SHIPPED | OUTPATIENT
Start: 2020-01-20 | End: 2020-01-20 | Stop reason: SDUPTHER

## 2020-01-20 RX ORDER — VALACYCLOVIR HYDROCHLORIDE 1 G/1
TABLET, FILM COATED ORAL
Qty: 4 TABLET | Refills: 0 | Status: SHIPPED | OUTPATIENT
Start: 2020-01-20 | End: 2020-07-22 | Stop reason: SDUPTHER

## 2020-01-20 NOTE — PROGRESS NOTES
Assessment/Plan:       Problem List Items Addressed This Visit        Digestive    Herpes labialis    Relevant Medications    valACYclovir (VALTREX) 1,000 mg tablet      Other Visit Diagnoses     Scrotal mass    -  Primary    Relevant Orders    US scrotum and testicles    UA w Reflex to Microscopic w Reflex to Culture    Intermittent urinary stream        Relevant Orders    POCT urine dip auto non-scope (Completed)    Ambulatory referral to Urology    UA w Reflex to Microscopic w Reflex to Culture    Flu vaccine need        Relevant Orders    influenza vaccine, [id number], quadrivalent, 0 5 mL, preservative-free, for adult and pediatric patients 6 mos+ (AFLURIA, FLUARIX, FLULAVAL, FLUZONE) (Completed)            BMI Counseling: Body mass index is 29 66 kg/m²  The BMI is above normal  Nutrition recommendations include encouraging healthy choices of fruits and vegetables  Tobacco Cessation Counseling: Tobacco cessation counseling was provided  The patient is sincerely urged to quit consumption of tobacco  He is not ready to quit tobacco        Subjective:      Patient ID: Kevin Jacobo is a 45 y o  male  Patient presents for an acute visit  He noted a lump in his scrotum about 1-2 weeks ago  He reports that it is not bothersome, so he could have had it for longer but not noticed it  He denies any changes to the lump since he first noted it  Urinary symptoms: interrupted flow of urine- ongoing for about the past 6 months  It is intermittent and when he does go, it is a weak stream    He denies any difficulty getting or maintaining an erection  He denies dysuria or increased frequency or urgency  Denies hematuria, urine color changes  He had a vasectomy 2 years ago  He reports that it was uncomplicated  He denies new sexual partners, has been with wife as only sexual partner for past 17 years          The following portions of the patient's history were reviewed and updated as appropriate: allergies, current medications, past family history, past medical history, past social history, past surgical history and problem list     Review of Systems   Constitutional: Positive for fatigue (works many hours)  Negative for chills  HENT: Negative for trouble swallowing  Respiratory: Negative for shortness of breath  Cardiovascular: Negative for chest pain  Gastrointestinal: Negative for abdominal pain, constipation, diarrhea, nausea and vomiting  Genitourinary: Positive for decreased urine volume (per HPI) and difficulty urinating (per HPI)  Negative for discharge, dysuria, frequency, genital sores, hematuria, penile pain, penile swelling, scrotal swelling, testicular pain and urgency  Musculoskeletal: Negative for gait problem  Skin: Negative for rash  Neurological: Negative for dizziness and headaches  Psychiatric/Behavioral: The patient is nervous/anxious (situational)            Past Medical History:   Diagnosis Date    Hypertension 3/23/2015         Current Outpatient Medications:     amLODIPine (NORVASC) 5 mg tablet, TAKE 1 TABLET DAILY, Disp: 90 tablet, Rfl: 4    fluticasone (FLONASE) 50 mcg/act nasal spray, 1 spray into each nostril daily (Patient taking differently: 1 spray into each nostril as needed ), Disp: 16 g, Rfl: 0    ketoconazole (NIZORAL) 2 % cream, Apply topically daily (Patient not taking: Reported on 1/20/2020), Disp: 45 g, Rfl: 3    valACYclovir (VALTREX) 1,000 mg tablet, Take 2 tablets twice a day for 1 day for outbreaks, Disp: 4 tablet, Rfl: 0    Allergies   Allergen Reactions    Amoxicillin Drowsiness    Penicillins        Social History   Past Surgical History:   Procedure Laterality Date    APPENDECTOMY      WISDOM TOOTH EXTRACTION       Family History   Problem Relation Age of Onset    Hypertension Mother     Diabetes Maternal Grandfather        Objective:  /78 (BP Location: Left arm, Patient Position: Sitting, Cuff Size: Large)   Pulse 71 Temp 98 3 °F (36 8 °C) (Oral)   Ht 6' 2" (1 88 m)   Wt 105 kg (231 lb)   SpO2 98%   BMI 29 66 kg/m²        Physical Exam   Constitutional: He is oriented to person, place, and time  He appears well-developed and well-nourished  No distress  HENT:   Head: Normocephalic and atraumatic  Eyes: No scleral icterus  Neck: Normal range of motion  Cardiovascular: Normal rate  Pulmonary/Chest: Effort normal    Abdominal: Soft  Hernia confirmed negative in the right inguinal area and confirmed negative in the left inguinal area  Genitourinary: Rectum normal, prostate normal and penis normal  Rectal exam shows no external hemorrhoid, no internal hemorrhoid, no fissure, no mass, no tenderness and anal tone normal  Cremasteric reflex is present  Right testis shows mass (small mass noted deep under right testicle, unclear if part of testicle or separate)  Right testis shows no swelling and no tenderness  Circumcised  No penile tenderness  No discharge found  Musculoskeletal: He exhibits no edema  Lymphadenopathy: No inguinal adenopathy noted on the right or left side  Neurological: He is alert and oriented to person, place, and time  Skin: Skin is warm  No erythema  Psychiatric: He has a normal mood and affect   His behavior is normal

## 2020-01-20 NOTE — PATIENT INSTRUCTIONS
Get scrotal US done  Referred to urology for follow up on urinary symptoms  Flu shot given today  Urine sent to lab for further assessment for infection  We will call if you need an antibiotic

## 2020-01-22 ENCOUNTER — TELEPHONE (OUTPATIENT)
Dept: OTHER | Facility: OTHER | Age: 45
End: 2020-01-22

## 2020-01-22 ENCOUNTER — TELEPHONE (OUTPATIENT)
Dept: INTERNAL MEDICINE CLINIC | Facility: CLINIC | Age: 45
End: 2020-01-22

## 2020-01-22 NOTE — TELEPHONE ENCOUNTER
Patient calling for results of ultrasound and urine test that he had completed on Monday    He can be reached at [phone]

## 2020-01-22 NOTE — TELEPHONE ENCOUNTER
Diana Dwyer called requesting test results from testing he had done earlier this week please call him back

## 2020-01-23 ENCOUNTER — TELEPHONE (OUTPATIENT)
Dept: INTERNAL MEDICINE CLINIC | Facility: CLINIC | Age: 45
End: 2020-01-23

## 2020-01-24 ENCOUNTER — TELEPHONE (OUTPATIENT)
Dept: INTERNAL MEDICINE CLINIC | Facility: CLINIC | Age: 45
End: 2020-01-24

## 2020-01-24 PROBLEM — I86.1 VARICOCELE: Status: ACTIVE | Noted: 2020-01-24

## 2020-01-24 PROBLEM — N43.3 HYDROCELE IN ADULT: Status: ACTIVE | Noted: 2020-01-24

## 2020-01-24 NOTE — TELEPHONE ENCOUNTER
Called and reviewed patient's ultrasound and urinalysis with him  Patient has Urology follow-up scheduled next week  Currently not having any symptoms  No testicular pain  No testicular swelling  He notes he was sent for the ultrasound after he felt a lump on his right testicle  I discussed the ultrasound findings of a left-sided varicocele and a prominent venous plexus on the right side  I discussed he has bilateral debris containing hydroceles  Advised him to keep Urology follow-up next week as scheduled  I discussed that urology will discussed with him and his follow-up need for follow-up imaging

## 2020-01-24 NOTE — TELEPHONE ENCOUNTER
Please call patient today he is upset that he has not talked to anyone yet it been two days since he received the first call  He really wants his results of u/s and labs   Please call asap/

## 2020-01-24 NOTE — TELEPHONE ENCOUNTER
Pt is very concerned that he has not heard back from anyone regarding his US & BW    Please call him back as soon as possible

## 2020-01-29 ENCOUNTER — CONSULT (OUTPATIENT)
Dept: UROLOGY | Facility: AMBULATORY SURGERY CENTER | Age: 45
End: 2020-01-29
Payer: COMMERCIAL

## 2020-01-29 VITALS
BODY MASS INDEX: 29.65 KG/M2 | DIASTOLIC BLOOD PRESSURE: 80 MMHG | WEIGHT: 231 LBS | SYSTOLIC BLOOD PRESSURE: 132 MMHG | HEIGHT: 74 IN | HEART RATE: 74 BPM

## 2020-01-29 DIAGNOSIS — R39.13 INTERMITTENT URINARY STREAM: Primary | ICD-10-CM

## 2020-01-29 LAB
POST-VOID RESIDUAL VOLUME, ML POC: 34 ML
SL AMB  POCT GLUCOSE, UA: NORMAL
SL AMB LEUKOCYTE ESTERASE,UA: NORMAL
SL AMB POCT BILIRUBIN,UA: NORMAL
SL AMB POCT BLOOD,UA: NORMAL
SL AMB POCT CLARITY,UA: CLEAR
SL AMB POCT COLOR,UA: YELLOW
SL AMB POCT KETONES,UA: NORMAL
SL AMB POCT NITRITE,UA: NORMAL
SL AMB POCT PH,UA: 6
SL AMB POCT SPECIFIC GRAVITY,UA: 1.02
SL AMB POCT URINE PROTEIN: NORMAL
SL AMB POCT UROBILINOGEN: 0.2

## 2020-01-29 PROCEDURE — 51798 US URINE CAPACITY MEASURE: CPT | Performed by: UROLOGY

## 2020-01-29 PROCEDURE — 99243 OFF/OP CNSLTJ NEW/EST LOW 30: CPT | Performed by: UROLOGY

## 2020-01-29 PROCEDURE — 81002 URINALYSIS NONAUTO W/O SCOPE: CPT | Performed by: UROLOGY

## 2020-01-29 NOTE — PROGRESS NOTES
1/29/2020    Alfredo Carrasquillo  1975  [id number]        Assessment  Lower urinary tract symptoms    Plan  We discussed potential etiologies for symptoms  He was reassured that varicocele is not of concern and that there is no concerning testicular mass  Regarding his urinary symptoms, we discussed possible further evaluation including prostate examination, uroflow, cystoscopy  His symptoms are not terribly bothersome at this time although they are slightly bothersome  He would like to consider this further prior to any more testing  For now I would like him to schedule a routine follow-up in 1 year with uroflow and postvoid residual   If he changes his mind we can do this sooner and potentially consider cystoscopy to evaluate the urethra and bladder outlet  All questions answered and he agrees with the plan  History of Present Illness  Carmell Leventhal is a 45 y o  male noticed an abnormal nodule and the right hemiscrotum  He was evaluated and sent for an ultrasound  He presents today to review this and discuss  He also complains of chronic urinary symptom, primarily weak urinary stream and a feeling that he has to strain to empty  He denies any specific trauma to the area  No prior urologic history  There is no pertinent family history  Ultrasound of the scrotum was reviewed with the patient  There is no abnormality identified of the testes or epididymides  He does have small bilateral varicoceles  Bladder scan postvoid residual 34 mL in office today  AUA SYMPTOM SCORE      Most Recent Value   AUA SYMPTOM SCORE   How often have you had a sensation of not emptying your bladder completely after you finished urinating? 1   How often have you had to urinate again less than two hours after you finished urinating? 1   How often have you found you stopped and started again several times when you urinate? 2   How often have you found it difficult to postpone urination?   0   How often have you had a weak urinary stream?  4   How often have you had to push or strain to begin urination? 1   How many times did you most typically get up to urinate from the time you went to bed at night until the time you got up in the morning?  0   Quality of Life: If you were to spend the rest of your life with your urinary condition just the way it is now, how would you feel about that?  2   AUA SYMPTOM SCORE  9          Review of Systems  Review of Systems   Constitutional: Negative  HENT: Negative  Respiratory: Negative  Cardiovascular: Negative  Gastrointestinal: Negative  Genitourinary:        As per HPI   Musculoskeletal: Negative  Skin: Negative  Neurological: Negative  Hematological: Negative            Past Medical History  Past Medical History:   Diagnosis Date    Hypertension 3/23/2015       Past Social History  Past Surgical History:   Procedure Laterality Date    APPENDECTOMY      WISDOM TOOTH EXTRACTION         Past Family History  Family History   Problem Relation Age of Onset    Hypertension Mother     Diabetes Maternal Grandfather        Past Social history  Social History     Socioeconomic History    Marital status: /Civil Union     Spouse name: Not on file    Number of children: Not on file    Years of education: Not on file    Highest education level: Not on file   Occupational History    Not on file   Social Needs    Financial resource strain: Not on file    Food insecurity:     Worry: Not on file     Inability: Not on file    Transportation needs:     Medical: Not on file     Non-medical: Not on file   Tobacco Use    Smoking status: Current Some Day Smoker    Smokeless tobacco: Current User    Tobacco comment: cigar only   Substance and Sexual Activity    Alcohol use: Yes     Comment: social    Drug use: No    Sexual activity: Not on file   Lifestyle    Physical activity:     Days per week: Not on file     Minutes per session: Not on file    Stress: Not on file   Relationships    Social connections:     Talks on phone: Not on file     Gets together: Not on file     Attends Religion service: Not on file     Active member of club or organization: Not on file     Attends meetings of clubs or organizations: Not on file     Relationship status: Not on file    Intimate partner violence:     Fear of current or ex partner: Not on file     Emotionally abused: Not on file     Physically abused: Not on file     Forced sexual activity: Not on file   Other Topics Concern    Not on file   Social History Narrative    Not on file     Social History     Tobacco Use   Smoking Status Current Some Day Smoker   Smokeless Tobacco Current User   Tobacco Comment    cigar only       Current Medications  Current Outpatient Medications   Medication Sig Dispense Refill    amLODIPine (NORVASC) 5 mg tablet TAKE 1 TABLET DAILY 90 tablet 4    fluticasone (FLONASE) 50 mcg/act nasal spray 1 spray into each nostril daily (Patient taking differently: 1 spray into each nostril as needed ) 16 g 0    valACYclovir (VALTREX) 1,000 mg tablet Take 2 tablets twice a day for 1 day for outbreaks 4 tablet 0    ketoconazole (NIZORAL) 2 % cream Apply topically daily (Patient not taking: Reported on 1/20/2020) 45 g 3     No current facility-administered medications for this visit  Allergies  Allergies   Allergen Reactions    Amoxicillin Drowsiness    Penicillins        Past Medical History, Social History, Family History, medications and allergies were reviewed  Vitals  Vitals:    01/29/20 0826   BP: 132/80   BP Location: Left arm   Patient Position: Sitting   Cuff Size: Standard   Pulse: 74   Weight: 105 kg (231 lb)   Height: 6' 2" (1 88 m)       Physical Exam  Physical Exam   Constitutional: He is oriented to person, place, and time  He appears well-developed and well-nourished  Cardiovascular: Normal rate  Pulmonary/Chest: Effort normal    Abdominal: Soft     Genitourinary:   Genitourinary Comments: Normal testes  Subclinical varicoceles  Patient declined prostate examination  Musculoskeletal: Normal range of motion  Neurological: He is alert and oriented to person, place, and time  Skin: Skin is warm, dry and intact  Psychiatric: He has a normal mood and affect  Vitals reviewed          Results  No results found for: PSA  Lab Results   Component Value Date    GLUCOSE 100 03/31/2015    CALCIUM 9 1 08/15/2018     03/31/2015    K 4 6 08/15/2018    CO2 28 08/15/2018     08/15/2018    BUN 15 08/15/2018    CREATININE 1 02 08/15/2018     Lab Results   Component Value Date    WBC 4 79 08/15/2018    HGB 14 4 08/15/2018    HCT 44 5 08/15/2018    MCV 90 08/15/2018     08/15/2018

## 2020-07-22 DIAGNOSIS — B00.1 HERPES LABIALIS: ICD-10-CM

## 2020-07-22 RX ORDER — VALACYCLOVIR HYDROCHLORIDE 1 G/1
TABLET, FILM COATED ORAL
Qty: 4 TABLET | Refills: 0 | Status: SHIPPED | OUTPATIENT
Start: 2020-07-22 | End: 2020-08-17 | Stop reason: SDUPTHER

## 2020-07-22 NOTE — TELEPHONE ENCOUNTER
LOV: 1/20/20  NOV nothing scheduled      States he is starting with an out break on his lips again and would like the valtrex filled

## 2020-08-17 ENCOUNTER — OFFICE VISIT (OUTPATIENT)
Dept: INTERNAL MEDICINE CLINIC | Facility: CLINIC | Age: 45
End: 2020-08-17
Payer: COMMERCIAL

## 2020-08-17 VITALS
BODY MASS INDEX: 28.77 KG/M2 | OXYGEN SATURATION: 98 % | HEART RATE: 67 BPM | SYSTOLIC BLOOD PRESSURE: 116 MMHG | TEMPERATURE: 98.1 F | WEIGHT: 224.2 LBS | HEIGHT: 74 IN | DIASTOLIC BLOOD PRESSURE: 80 MMHG

## 2020-08-17 DIAGNOSIS — I10 ESSENTIAL HYPERTENSION: ICD-10-CM

## 2020-08-17 DIAGNOSIS — Z13.220 SCREENING FOR HYPERLIPIDEMIA: ICD-10-CM

## 2020-08-17 DIAGNOSIS — Z13.29 SCREENING FOR THYROID DISORDER: ICD-10-CM

## 2020-08-17 DIAGNOSIS — B00.1 HERPES LABIALIS: ICD-10-CM

## 2020-08-17 DIAGNOSIS — Z00.00 HEALTH MAINTENANCE EXAMINATION: Primary | ICD-10-CM

## 2020-08-17 PROBLEM — B36.0 TINEA VERSICOLOR: Status: RESOLVED | Noted: 2018-08-14 | Resolved: 2020-08-17

## 2020-08-17 PROBLEM — R53.83 OTHER FATIGUE: Status: RESOLVED | Noted: 2018-08-14 | Resolved: 2020-08-17

## 2020-08-17 PROCEDURE — 3008F BODY MASS INDEX DOCD: CPT | Performed by: NURSE PRACTITIONER

## 2020-08-17 PROCEDURE — 3074F SYST BP LT 130 MM HG: CPT | Performed by: NURSE PRACTITIONER

## 2020-08-17 PROCEDURE — 4004F PT TOBACCO SCREEN RCVD TLK: CPT | Performed by: NURSE PRACTITIONER

## 2020-08-17 PROCEDURE — 99396 PREV VISIT EST AGE 40-64: CPT | Performed by: NURSE PRACTITIONER

## 2020-08-17 PROCEDURE — 3079F DIAST BP 80-89 MM HG: CPT | Performed by: NURSE PRACTITIONER

## 2020-08-17 RX ORDER — VALACYCLOVIR HYDROCHLORIDE 1 G/1
TABLET, FILM COATED ORAL
Qty: 30 TABLET | Refills: 0 | Status: SHIPPED | OUTPATIENT
Start: 2020-08-17 | End: 2021-07-23 | Stop reason: SDUPTHER

## 2020-08-17 NOTE — PROGRESS NOTES
Assessment/Plan:    Pt presents for routine physical   See below  Will update labs - I will call pt with results    Follow-up yearly, sooner if needed       Problem List Items Addressed This Visit        Digestive    Herpes labialis     Prn valtrex         Relevant Medications    valACYclovir (VALTREX) 1,000 mg tablet       Cardiovascular and Mediastinum    Hypertension     Followed by cardiology  Continue amlodipine  controlled         Relevant Orders    CBC and differential    Comprehensive metabolic panel      Other Visit Diagnoses     Health maintenance examination    -  Primary    work on dietary improvements, less sweets  routine exercise  consider PNA vaccine    Screening for hyperlipidemia        Relevant Orders    Lipid panel    Screening for thyroid disorder        Relevant Orders    TSH, 3rd generation with Free T4 reflex            Subjective:      Patient ID: Sherrie Perez is a 45 y o  male and presents today for Health Maintenance Physical     Last Physical: > 1 year ago    Pt reports overall health:  Overall healthy    Healthy Diet:  Fairly well rounded  Does report high sweets - poptarts or brownies for breakfast   Adequate fruits and vegetables  Drinks primarily water  Routine Exercise:  Cross fit 3x+ a week  Weight Concerns:  No concerns  Problems with vision:  No concerns  Last Eye Exam:  1-2 years ago    Problems with Hearing:  No new concerns  Hx of problems    Last hearing test a few years ago and normal       Routine Dental Exams:  yes    Smoking History: occasional cigar  ETOH Use:  A few times a week  Illegal Drug Use:  no  Caffeine Use:  Coffee daily approx 20 oz    Family history of CA:  no    Last Labs:  2018    The following portions of the patient's history were reviewed and updated as appropriate: allergies, current medications, past family history, past medical history, past social history, past surgical history and problem list     Review of Systems   Constitutional: Negative for activity change, appetite change, chills, fatigue, fever and unexpected weight change  HENT: Negative for congestion, postnasal drip, sinus pressure, sinus pain and sore throat  Respiratory: Negative for cough, chest tightness, shortness of breath and wheezing  Cardiovascular: Negative for chest pain, palpitations and leg swelling  Gastrointestinal: Negative for abdominal pain, constipation, diarrhea, nausea and vomiting  Genitourinary: Negative for dysuria, hematuria and testicular pain  Musculoskeletal: Negative for arthralgias and myalgias  Neurological: Negative for dizziness, syncope, speech difficulty, light-headedness and headaches  Psychiatric/Behavioral: Negative for behavioral problems, dysphoric mood and sleep disturbance  The patient is not nervous/anxious  Past Medical History:   Diagnosis Date    Hypertension 3/23/2015         Current Outpatient Medications:     amLODIPine (NORVASC) 5 mg tablet, TAKE 1 TABLET DAILY, Disp: 90 tablet, Rfl: 4    valACYclovir (VALTREX) 1,000 mg tablet, Take 2 tablets twice a day for 1 day at first sign of outbreak, Disp: 30 tablet, Rfl: 0    Allergies   Allergen Reactions    Amoxicillin Drowsiness    Penicillins        Social History   Past Surgical History:   Procedure Laterality Date    APPENDECTOMY      WISDOM TOOTH EXTRACTION       Family History   Problem Relation Age of Onset    Hypertension Mother     Diabetes Maternal Grandfather        Objective:  /80 (BP Location: Left arm, Patient Position: Sitting, Cuff Size: Large)   Pulse 67   Temp 98 1 °F (36 7 °C) (Tympanic)   Ht 6' 2" (1 88 m)   Wt 102 kg (224 lb 3 2 oz)   SpO2 98% Comment: ra  BMI 28 79 kg/m²      Physical Exam  Vitals signs and nursing note reviewed  Constitutional:       General: He is not in acute distress  Appearance: He is well-developed  He is not ill-appearing, toxic-appearing or diaphoretic        Comments: + overweight   HENT: Head: Normocephalic and atraumatic  Right Ear: Tympanic membrane, ear canal and external ear normal  There is no impacted cerumen  Left Ear: Tympanic membrane, ear canal and external ear normal  There is no impacted cerumen  Eyes:      General: No scleral icterus  Comments: Equal and round   Neck:      Musculoskeletal: Neck supple  Thyroid: No thyromegaly  Cardiovascular:      Rate and Rhythm: Normal rate and regular rhythm  Heart sounds: Normal heart sounds  No murmur  Comments: No pedal edema  Pulmonary:      Effort: Pulmonary effort is normal  No respiratory distress  Breath sounds: Normal breath sounds  No wheezing  Abdominal:      General: Bowel sounds are normal  There is no distension  Palpations: Abdomen is soft  There is no mass  Tenderness: There is no abdominal tenderness  There is no guarding or rebound  Musculoskeletal: Normal range of motion  General: No swelling or deformity  Lymphadenopathy:      Cervical: No cervical adenopathy  Skin:     General: Skin is warm and dry  Coloration: Skin is not jaundiced or pale  Findings: No rash  Neurological:      General: No focal deficit present  Mental Status: He is alert and oriented to person, place, and time  Comments: No focal deficits   Psychiatric:         Mood and Affect: Mood normal          Behavior: Behavior normal          Thought Content:  Thought content normal          Judgment: Judgment normal

## 2020-08-18 ENCOUNTER — APPOINTMENT (OUTPATIENT)
Dept: LAB | Facility: IMAGING CENTER | Age: 45
End: 2020-08-18
Payer: COMMERCIAL

## 2020-08-18 DIAGNOSIS — Z13.220 SCREENING FOR HYPERLIPIDEMIA: ICD-10-CM

## 2020-08-18 DIAGNOSIS — Z13.29 SCREENING FOR THYROID DISORDER: ICD-10-CM

## 2020-08-18 DIAGNOSIS — I10 ESSENTIAL HYPERTENSION: ICD-10-CM

## 2020-08-18 LAB
ALBUMIN SERPL BCP-MCNC: 4.1 G/DL (ref 3.5–5)
ALP SERPL-CCNC: 55 U/L (ref 46–116)
ALT SERPL W P-5'-P-CCNC: 21 U/L (ref 12–78)
ANION GAP SERPL CALCULATED.3IONS-SCNC: 5 MMOL/L (ref 4–13)
AST SERPL W P-5'-P-CCNC: 14 U/L (ref 5–45)
BASOPHILS # BLD AUTO: 0.05 THOUSANDS/ΜL (ref 0–0.1)
BASOPHILS NFR BLD AUTO: 1 % (ref 0–1)
BILIRUB SERPL-MCNC: 0.81 MG/DL (ref 0.2–1)
BUN SERPL-MCNC: 22 MG/DL (ref 5–25)
CALCIUM SERPL-MCNC: 8.6 MG/DL (ref 8.3–10.1)
CHLORIDE SERPL-SCNC: 106 MMOL/L (ref 100–108)
CHOLEST SERPL-MCNC: 201 MG/DL (ref 50–200)
CO2 SERPL-SCNC: 29 MMOL/L (ref 21–32)
CREAT SERPL-MCNC: 1.12 MG/DL (ref 0.6–1.3)
EOSINOPHIL # BLD AUTO: 0.2 THOUSAND/ΜL (ref 0–0.61)
EOSINOPHIL NFR BLD AUTO: 4 % (ref 0–6)
ERYTHROCYTE [DISTWIDTH] IN BLOOD BY AUTOMATED COUNT: 12.4 % (ref 11.6–15.1)
GFR SERPL CREATININE-BSD FRML MDRD: 81 ML/MIN/1.73SQ M
GLUCOSE P FAST SERPL-MCNC: 93 MG/DL (ref 65–99)
HCT VFR BLD AUTO: 44 % (ref 36.5–49.3)
HDLC SERPL-MCNC: 62 MG/DL
HGB BLD-MCNC: 14.1 G/DL (ref 12–17)
IMM GRANULOCYTES # BLD AUTO: 0.01 THOUSAND/UL (ref 0–0.2)
IMM GRANULOCYTES NFR BLD AUTO: 0 % (ref 0–2)
LDLC SERPL CALC-MCNC: 115 MG/DL (ref 0–100)
LYMPHOCYTES # BLD AUTO: 2.09 THOUSANDS/ΜL (ref 0.6–4.47)
LYMPHOCYTES NFR BLD AUTO: 41 % (ref 14–44)
MCH RBC QN AUTO: 29.3 PG (ref 26.8–34.3)
MCHC RBC AUTO-ENTMCNC: 32 G/DL (ref 31.4–37.4)
MCV RBC AUTO: 92 FL (ref 82–98)
MONOCYTES # BLD AUTO: 0.7 THOUSAND/ΜL (ref 0.17–1.22)
MONOCYTES NFR BLD AUTO: 14 % (ref 4–12)
NEUTROPHILS # BLD AUTO: 2.09 THOUSANDS/ΜL (ref 1.85–7.62)
NEUTS SEG NFR BLD AUTO: 40 % (ref 43–75)
NONHDLC SERPL-MCNC: 139 MG/DL
NRBC BLD AUTO-RTO: 0 /100 WBCS
PLATELET # BLD AUTO: 204 THOUSANDS/UL (ref 149–390)
PMV BLD AUTO: 10.6 FL (ref 8.9–12.7)
POTASSIUM SERPL-SCNC: 4.2 MMOL/L (ref 3.5–5.3)
PROT SERPL-MCNC: 7.6 G/DL (ref 6.4–8.2)
RBC # BLD AUTO: 4.81 MILLION/UL (ref 3.88–5.62)
SODIUM SERPL-SCNC: 140 MMOL/L (ref 136–145)
TRIGL SERPL-MCNC: 122 MG/DL
TSH SERPL DL<=0.05 MIU/L-ACNC: 2.07 UIU/ML (ref 0.36–3.74)
WBC # BLD AUTO: 5.14 THOUSAND/UL (ref 4.31–10.16)

## 2020-08-18 PROCEDURE — 36415 COLL VENOUS BLD VENIPUNCTURE: CPT

## 2020-08-18 PROCEDURE — 85025 COMPLETE CBC W/AUTO DIFF WBC: CPT

## 2020-08-18 PROCEDURE — 80061 LIPID PANEL: CPT

## 2020-08-18 PROCEDURE — 84443 ASSAY THYROID STIM HORMONE: CPT

## 2020-08-18 PROCEDURE — 80053 COMPREHEN METABOLIC PANEL: CPT

## 2020-12-02 DIAGNOSIS — I10 ESSENTIAL HYPERTENSION: ICD-10-CM

## 2020-12-02 RX ORDER — AMLODIPINE BESYLATE 5 MG/1
TABLET ORAL
Qty: 90 TABLET | Refills: 3 | Status: SHIPPED | OUTPATIENT
Start: 2020-12-02 | End: 2021-11-27

## 2021-01-24 ENCOUNTER — NURSE TRIAGE (OUTPATIENT)
Dept: OTHER | Facility: OTHER | Age: 46
End: 2021-01-24

## 2021-01-24 DIAGNOSIS — Z20.828 SARS-ASSOCIATED CORONAVIRUS EXPOSURE: Primary | ICD-10-CM

## 2021-01-25 ENCOUNTER — TELEPHONE (OUTPATIENT)
Dept: UROLOGY | Facility: AMBULATORY SURGERY CENTER | Age: 46
End: 2021-01-25

## 2021-01-25 DIAGNOSIS — Z20.828 SARS-ASSOCIATED CORONAVIRUS EXPOSURE: ICD-10-CM

## 2021-01-25 PROCEDURE — U0005 INFEC AGEN DETEC AMPLI PROBE: HCPCS | Performed by: FAMILY MEDICINE

## 2021-01-25 PROCEDURE — U0003 INFECTIOUS AGENT DETECTION BY NUCLEIC ACID (DNA OR RNA); SEVERE ACUTE RESPIRATORY SYNDROME CORONAVIRUS 2 (SARS-COV-2) (CORONAVIRUS DISEASE [COVID-19]), AMPLIFIED PROBE TECHNIQUE, MAKING USE OF HIGH THROUGHPUT TECHNOLOGIES AS DESCRIBED BY CMS-2020-01-R: HCPCS | Performed by: FAMILY MEDICINE

## 2021-01-25 NOTE — TELEPHONE ENCOUNTER
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 yes  2  What was the date of your exposure? Wed 1/20/21  3  Are you experiencing any symptoms attributed to the virus?  (Assess for SOB, cough, fever, difficulty breathing) headaches  4  HIGH RISK: Do you have any history heart or lung conditions, weakened immune system, diabetes, Asthma, CHF, HIV, COPD, Chemo, renal failure, sickle cell, etc? no  5   PREGNANCY: Are you pregnant or did you recently give birth? n/a

## 2021-01-25 NOTE — TELEPHONE ENCOUNTER
Patient had exposure to COVID and was tested today no results yet  Will call with results to determine when to r/s

## 2021-01-26 LAB — SARS-COV-2 RNA RESP QL NAA+PROBE: NEGATIVE

## 2021-03-10 DIAGNOSIS — Z23 ENCOUNTER FOR IMMUNIZATION: ICD-10-CM

## 2021-03-11 ENCOUNTER — TELEPHONE (OUTPATIENT)
Dept: OTHER | Facility: OTHER | Age: 46
End: 2021-03-11

## 2021-03-11 ENCOUNTER — NURSE TRIAGE (OUTPATIENT)
Dept: OTHER | Facility: OTHER | Age: 46
End: 2021-03-11

## 2021-03-12 ENCOUNTER — OFFICE VISIT (OUTPATIENT)
Dept: INTERNAL MEDICINE CLINIC | Facility: CLINIC | Age: 46
End: 2021-03-12
Payer: COMMERCIAL

## 2021-03-12 ENCOUNTER — TELEPHONE (OUTPATIENT)
Dept: INTERNAL MEDICINE CLINIC | Age: 46
End: 2021-03-12

## 2021-03-12 ENCOUNTER — HOSPITAL ENCOUNTER (OUTPATIENT)
Dept: RADIOLOGY | Facility: HOSPITAL | Age: 46
Discharge: HOME | End: 2021-03-12
Payer: COMMERCIAL

## 2021-03-12 ENCOUNTER — TRANSCRIBE ORDERS (OUTPATIENT)
Dept: ADMINISTRATIVE | Facility: HOSPITAL | Age: 46
End: 2021-03-12

## 2021-03-12 VITALS
OXYGEN SATURATION: 98 % | RESPIRATION RATE: 20 BRPM | WEIGHT: 232.6 LBS | DIASTOLIC BLOOD PRESSURE: 86 MMHG | BODY MASS INDEX: 29.85 KG/M2 | SYSTOLIC BLOOD PRESSURE: 138 MMHG | HEART RATE: 70 BPM | HEIGHT: 74 IN | TEMPERATURE: 96.7 F

## 2021-03-12 DIAGNOSIS — M76.60 PAIN IN ACHILLES TENDON: Primary | ICD-10-CM

## 2021-03-12 DIAGNOSIS — M76.60 ACHILLES TENDINITIS, UNSPECIFIED LEG: Primary | ICD-10-CM

## 2021-03-12 DIAGNOSIS — M76.60 ACHILLES TENDINITIS, UNSPECIFIED LEG: ICD-10-CM

## 2021-03-12 PROCEDURE — 76882 US LMTD JT/FCL EVL NVASC XTR: CPT

## 2021-03-12 PROCEDURE — 3725F SCREEN DEPRESSION PERFORMED: CPT | Performed by: NURSE PRACTITIONER

## 2021-03-12 PROCEDURE — 3008F BODY MASS INDEX DOCD: CPT | Performed by: NURSE PRACTITIONER

## 2021-03-12 PROCEDURE — 99213 OFFICE O/P EST LOW 20 MIN: CPT | Performed by: NURSE PRACTITIONER

## 2021-03-12 PROCEDURE — 4004F PT TOBACCO SCREEN RCVD TLK: CPT | Performed by: NURSE PRACTITIONER

## 2021-03-12 NOTE — TELEPHONE ENCOUNTER
Pt called in stating he heard a popping in his left lower leg while exercising  Pt stated the pain is moderate and only bad when walking  Pt is requesting us make him a appointment with his PCP office to have his leg evaluated for tomorrow morning  Appointment made for Friday March 12th at 1030am with Marva Obando  Pt wanted the appointment to be made at the [facility]

## 2021-03-12 NOTE — TELEPHONE ENCOUNTER
Pt believes he may have injured his Achilles tendon  I put him in to be triaged by one of our nurses, but he requested that I document a request for the office to give him a call for an appointment to be seen

## 2021-03-12 NOTE — TELEPHONE ENCOUNTER
Reason for Disposition   Caused by strained muscle    Answer Assessment - Initial Assessment Questions  1  ONSET: "When did the pain start?"       Today after exercising   2  LOCATION: "Where is the pain located?"       Left ankle/calf area  [place] a popping noise   3  PAIN: "How bad is the pain?"    (Scale 1-10; or mild, moderate, severe)    -  MILD (1-3): doesn't interfere with normal activities     -  MODERATE (4-7): interferes with normal activities (e g , work or school) or awakens from sleep, limping     -  SEVERE (8-10): excruciating pain, unable to do any normal activities, unable to walk       Moderate  4  WORK OR EXERCISE: "Has there been any recent work or exercise that involved this part of the body?"       Yes exercising  5  CAUSE: "What do you think is causing the leg pain?"      Pulled muscle   6   OTHER SYMPTOMS: "Do you have any other symptoms?" (e g , chest pain, back pain, breathing difficulty, swelling, rash, fever, numbness, weakness)      Denies    Protocols used: LEG PAIN-ADULT-

## 2021-03-12 NOTE — PROGRESS NOTES
Assessment/Plan:    Problem List Items Addressed This Visit     None      Visit Diagnoses     Pain in Achilles tendon    -  Primary    suspect ruptured tendon   check STAT US   referral to ortho or poditary based on results        Relevant Orders    US msk guidance        BMI Counseling: Body mass index is 29 86 kg/m²  Discussed the patient's BMI with him  The BMI is above normal  Exercise recommendations include moderate aerobic physical activity for 150 minutes/week  patient has a large muscle mass and is physically active  M*Modal software was used to dictate this note  It may contain errors with dictating incorrect words or incorrect spelling  Please contact the provider directly with any questions  Subjective:      Patient ID: Luisa Elizalde is a 39 y o  male  HPI     Patient presents today with concerns for his left achilles tendon pain  Yesterday he was at baseball practice and was showing kids how to do an exercise  He had a resistance band around his waist and someone behind him pulling it  He was moving forward when he heard a "pop" and felt immediate pain in his left achilles  He reports the area has been sore, he iced it last night  He feels a "clicking" when he is walking  The following portions of the patient's history were reviewed and updated as appropriate: allergies, current medications, past family history, past medical history, past social history, past surgical history and problem list       Review of Systems   Constitutional: Negative for chills and fever  Musculoskeletal: Positive for arthralgias, gait problem and myalgias           Past Medical History:   Diagnosis Date    Hypertension 3/23/2015         Current Outpatient Medications:     amLODIPine (NORVASC) 5 mg tablet, TAKE 1 TABLET DAILY, Disp: 90 tablet, Rfl: 3    valACYclovir (VALTREX) 1,000 mg tablet, Take 2 tablets twice a day for 1 day at first sign of outbreak, Disp: 30 tablet, Rfl: 0    Allergies   Allergen Reactions    Amoxicillin Drowsiness    Penicillins        Social History   Past Surgical History:   Procedure Laterality Date    APPENDECTOMY      WISDOM TOOTH EXTRACTION       Family History   Problem Relation Age of Onset    Hypertension Mother     Diabetes Maternal Grandfather        Objective:  /86 (BP Location: Right arm, Patient Position: Sitting, Cuff Size: Large)   Pulse 70   Temp (!) 96 7 °F (35 9 °C) (Tympanic)   Resp 20   Ht 6' 2" (1 88 m)   Wt 106 kg (232 lb 9 6 oz)   SpO2 98%   BMI 29 86 kg/m²      Physical Exam  Vitals signs reviewed  Constitutional:       General: He is not in acute distress  Appearance: Normal appearance  He is normal weight  He is not diaphoretic  HENT:      Head: Normocephalic and atraumatic  Comments: masked  Pulmonary:      Effort: Pulmonary effort is normal  No respiratory distress  Musculoskeletal:      Left ankle: He exhibits normal range of motion and no swelling  Tenderness (achilles tendon)  Achilles tendon exhibits pain  Neurological:      Mental Status: He is alert and oriented to person, place, and time  Mental status is at baseline     Psychiatric:         Mood and Affect: Mood normal          Behavior: Behavior normal

## 2021-03-12 NOTE — TELEPHONE ENCOUNTER
Regarding: Potential achilles tendon   ----- Message from Cinda Guzmán sent at 3/11/2021  9:55 PM EST -----  "I was doing some exercises today and heard a popping sound from my left achilles tendon   I have it on ice right now, and the pain is not persistent, but something feels off "

## 2021-07-23 DIAGNOSIS — B00.1 HERPES LABIALIS: ICD-10-CM

## 2021-07-23 RX ORDER — VALACYCLOVIR HYDROCHLORIDE 1 G/1
TABLET, FILM COATED ORAL
Qty: 30 TABLET | Refills: 0 | Status: SHIPPED | OUTPATIENT
Start: 2021-07-23 | End: 2022-06-28 | Stop reason: SDUPTHER

## 2021-07-23 NOTE — TELEPHONE ENCOUNTER
3/12/2021  8/17/2021  Requested Prescriptions     Pending Prescriptions Disp Refills    valACYclovir (VALTREX) 1,000 mg tablet 30 tablet 0     Sig: Take 2 tablets twice a day for 1 day at first sign of outbreak

## 2021-08-17 ENCOUNTER — OFFICE VISIT (OUTPATIENT)
Dept: INTERNAL MEDICINE CLINIC | Facility: CLINIC | Age: 46
End: 2021-08-17
Payer: COMMERCIAL

## 2021-08-17 VITALS
HEART RATE: 68 BPM | SYSTOLIC BLOOD PRESSURE: 116 MMHG | HEIGHT: 74 IN | TEMPERATURE: 97.5 F | WEIGHT: 228 LBS | DIASTOLIC BLOOD PRESSURE: 82 MMHG | BODY MASS INDEX: 29.26 KG/M2 | OXYGEN SATURATION: 99 %

## 2021-08-17 DIAGNOSIS — R14.3 EXCESSIVE FLATUS: ICD-10-CM

## 2021-08-17 DIAGNOSIS — Z11.59 NEED FOR HEPATITIS C SCREENING TEST: Primary | ICD-10-CM

## 2021-08-17 DIAGNOSIS — M79.10 MYALGIA: ICD-10-CM

## 2021-08-17 DIAGNOSIS — M77.8 FOREARM TENDONITIS: ICD-10-CM

## 2021-08-17 DIAGNOSIS — H91.13 PRESBYCUSIS OF BOTH EARS: ICD-10-CM

## 2021-08-17 DIAGNOSIS — R35.0 INCREASED FREQUENCY OF URINATION: ICD-10-CM

## 2021-08-17 DIAGNOSIS — K92.2 LOWER GI BLEEDING: ICD-10-CM

## 2021-08-17 PROBLEM — M65.231: Status: ACTIVE | Noted: 2021-08-17

## 2021-08-17 PROBLEM — M65.231: Status: RESOLVED | Noted: 2021-08-17 | Resolved: 2021-08-17

## 2021-08-17 PROCEDURE — 3008F BODY MASS INDEX DOCD: CPT | Performed by: INTERNAL MEDICINE

## 2021-08-17 PROCEDURE — 3725F SCREEN DEPRESSION PERFORMED: CPT | Performed by: INTERNAL MEDICINE

## 2021-08-17 PROCEDURE — 99214 OFFICE O/P EST MOD 30 MIN: CPT | Performed by: INTERNAL MEDICINE

## 2021-08-17 PROCEDURE — 4004F PT TOBACCO SCREEN RCVD TLK: CPT | Performed by: INTERNAL MEDICINE

## 2021-08-17 NOTE — ASSESSMENT & PLAN NOTE
Recommended the patient seek out a hearing assessment either through ENT or local audiology which may offer free screening n return for a sales pitch for hearing aid

## 2021-08-17 NOTE — PROGRESS NOTES
Assessment/Plan:    Presbycusis of both ears   Recommended the patient seek out a hearing assessment either through ENT or local audiology which may offer free screening n return for a sales pitch for hearing aid    Excessive flatus   Initiate simethicone 80 mg 1 to 5 tablets with a meal that would be suspected to precipitate flatus    Increased frequency of urination   Check a PSA and free PSA  Symptoms could be related to possible enlarged prostate or prostatitis  Declined rectal examination in office    Lower GI bleeding   Referral to Gastroenterology  Forearm tendonitis    Stretching exercises, applications of Voltaren gel and use of arnica [place] after activity       Diagnoses and all orders for this visit:    Need for hepatitis C screening test  -     Hepatitis C antibody; Future    Lower GI bleeding  -     Ambulatory referral to Gastroenterology; Future  -     CBC and differential; Future  -     Comprehensive metabolic panel; Future    Increased frequency of urination  -     PSA, total and free; Future  -     CBC and differential; Future  -     C-reactive protein; Future  -     Comprehensive metabolic panel; Future  -     UA w Reflex to Microscopic w Reflex to Culture - Clinic Collect    Myalgia  -     CK (with reflex to MB); Future  -     Comprehensive metabolic panel; Future    Forearm tendonitis    Presbycusis of both ears    Excessive flatus          Subjective:      Patient ID: Satish Bills is a 39 y o  male  Patient presents to the office for follow-up visit  He relates several issues  He has been experiencing what he perceives to be an excessive amount of  Intestinal gas  He denies any pain or discomfort only the fact that he has to expel large amounts of gas usually the following morning after a precipitating meal   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    Before he went on vacation he was noticing right forearm discomfort which at times made it increasingly painful for him to work  The pain improved while he was on vacation since he was not working and now that he has returned and has started working again the pain has reoccurred  Also, while on vacation he experienced several bloody bowel movements on 1 occasion  There was no abdominal pain or discomfort  There was normal appearing stool mixed in with his bowel movement  Symptoms resolved after about 4 or 5 bowel movements and has not returned  He also complains of worsening hearing loss  He had a hearing test done several years ago which noted some mild sensorineural hearing loss but he states it has progressed and is wondering what he should do  In addition to his forearm pain he complains of some chest and upper back discomfort after working out [employer] which he does regularly      Family History   Problem Relation Age of Onset    Hypertension Mother     Anxiety disorder Mother     Irregular heart beat Mother         pacemaker    Diabetes Maternal Grandfather     No Known Problems Father      Social History     Socioeconomic History    Marital status: /Civil Union     Spouse name: Not on file    Number of children: Not on file    Years of education: Not on file    Highest education level: Not on file   Occupational History    Not on file   Tobacco Use    Smoking status: Current Some Day Smoker     Types: Cigars    Smokeless tobacco: Current User    Tobacco comment: occasional cigar   Vaping Use    Vaping Use: Some days    Substances: THC   Substance and Sexual Activity    Alcohol use: Yes     Comment: 25 drinks per week - IPA beer and whisky    Drug use: Yes     Types:  Other     Comment: Vapes THC    Sexual activity: Not Currently   Other Topics Concern    Not on file   Social History Narrative    Not on file     Social Determinants of Health     Financial Resource Strain:     Difficulty of Paying Living Expenses:    Food Insecurity:     Worried About Running Out of Food in the Last Year:    [address] in the Last Year:    Transportation Needs:     Lack of Transportation (Medical):  Lack of Transportation (Non-Medical):    Physical Activity:     Days of Exercise per Week:     Minutes of Exercise per Session:    Stress:     Feeling of Stress :    Social Connections:     Frequency of Communication with Friends and Family:     Frequency of Social Gatherings with Friends and Family:     Attends Rastafari Services:     Active Member of Clubs or Organizations:     Attends Club or Organization Meetings:     Marital Status:    Intimate Partner Violence:     Fear of Current or Ex-Partner:     Emotionally Abused:     Physically Abused:     Sexually Abused:      Past Medical History:   Diagnosis Date    Hypertension 3/23/2015       Current Outpatient Medications:     amLODIPine (NORVASC) 5 mg tablet, TAKE 1 TABLET DAILY, Disp: 90 tablet, Rfl: 3    valACYclovir (VALTREX) 1,000 mg tablet, Take 2 tablets twice a day for 1 day at first sign of outbreak, Disp: 30 tablet, Rfl: 0  Allergies   Allergen Reactions    Amoxicillin Drowsiness    Penicillins      Past Surgical History:   Procedure Laterality Date    APPENDECTOMY      WISDOM TOOTH EXTRACTION           Review of Systems   Constitutional: Negative  HENT: Negative  Eyes: Negative  Cardiovascular: Negative  Gastrointestinal: Positive for blood in stool  Endocrine: Negative  Genitourinary: Positive for difficulty urinating (Diminished force of stream)  Musculoskeletal: Positive for myalgias (Right forearm)  Skin: Negative  Allergic/Immunologic: Negative  Neurological: Negative  Hematological: Negative  Psychiatric/Behavioral: Negative            Objective:      /82   Pulse 68   Temp 97 5 °F (36 4 °C) (Tympanic)   Ht 6' 2 21" (1 885 m)   Wt 103 kg (228 lb)   SpO2 99%   BMI 29 11 kg/m²          Physical Exam  Constitutional:       General: He is not in acute distress  Appearance: Normal appearance  He is normal weight  He is not ill-appearing, toxic-appearing or diaphoretic  HENT:      Head: Normocephalic  Eyes:      Conjunctiva/sclera: Conjunctivae normal       Pupils: Pupils are equal, round, and reactive to light  Cardiovascular:      Rate and Rhythm: Normal rate  Pulmonary:      Effort: Pulmonary effort is normal  No respiratory distress  Abdominal:      General: Abdomen is flat  There is no distension  Genitourinary:     Rectum: Normal    Musculoskeletal:         General: Tenderness (Right brachioradialis muscle) present  Cervical back: Neck supple  Right lower leg: No edema  Left lower leg: No edema  Skin:     General: Skin is warm  Coloration: Skin is not jaundiced  Findings: No bruising  Neurological:      General: No focal deficit present  Mental Status: He is alert and oriented to person, place, and time  Mental status is at baseline     Psychiatric:         Mood and Affect: Mood normal          Behavior: Behavior normal

## 2021-08-17 NOTE — ASSESSMENT & PLAN NOTE
Check a PSA and free PSA  Symptoms could be related to possible enlarged prostate or prostatitis    Declined rectal examination in office

## 2021-08-18 ENCOUNTER — APPOINTMENT (OUTPATIENT)
Dept: LAB | Facility: IMAGING CENTER | Age: 46
End: 2021-08-18
Payer: COMMERCIAL

## 2021-08-18 DIAGNOSIS — K92.2 LOWER GI BLEEDING: ICD-10-CM

## 2021-08-18 DIAGNOSIS — R35.0 INCREASED FREQUENCY OF URINATION: ICD-10-CM

## 2021-08-18 DIAGNOSIS — Z11.59 NEED FOR HEPATITIS C SCREENING TEST: ICD-10-CM

## 2021-08-18 DIAGNOSIS — M79.10 MYALGIA: ICD-10-CM

## 2021-08-18 LAB
ALBUMIN SERPL BCP-MCNC: 4.1 G/DL (ref 3.5–5)
ALP SERPL-CCNC: 54 U/L (ref 46–116)
ALT SERPL W P-5'-P-CCNC: 38 U/L (ref 12–78)
ANION GAP SERPL CALCULATED.3IONS-SCNC: 2 MMOL/L (ref 4–13)
AST SERPL W P-5'-P-CCNC: 25 U/L (ref 5–45)
BASOPHILS # BLD AUTO: 0.06 THOUSANDS/ΜL (ref 0–0.1)
BASOPHILS NFR BLD AUTO: 1 % (ref 0–1)
BILIRUB SERPL-MCNC: 0.88 MG/DL (ref 0.2–1)
BILIRUB UR QL STRIP: NEGATIVE
BUN SERPL-MCNC: 20 MG/DL (ref 5–25)
CALCIUM SERPL-MCNC: 8.8 MG/DL (ref 8.3–10.1)
CHLORIDE SERPL-SCNC: 109 MMOL/L (ref 100–108)
CK MB SERPL-MCNC: 1 % (ref 0–2.5)
CK MB SERPL-MCNC: 1.8 NG/ML (ref 0–5)
CK SERPL-CCNC: 188 U/L (ref 39–308)
CLARITY UR: NORMAL
CO2 SERPL-SCNC: 27 MMOL/L (ref 21–32)
COLOR UR: NORMAL
CREAT SERPL-MCNC: 1.07 MG/DL (ref 0.6–1.3)
CRP SERPL QL: <3 MG/L
EOSINOPHIL # BLD AUTO: 0.19 THOUSAND/ΜL (ref 0–0.61)
EOSINOPHIL NFR BLD AUTO: 4 % (ref 0–6)
ERYTHROCYTE [DISTWIDTH] IN BLOOD BY AUTOMATED COUNT: 12.5 % (ref 11.6–15.1)
GFR SERPL CREATININE-BSD FRML MDRD: 85 ML/MIN/1.73SQ M
GLUCOSE P FAST SERPL-MCNC: 86 MG/DL (ref 65–99)
GLUCOSE UR STRIP-MCNC: NEGATIVE MG/DL
HCT VFR BLD AUTO: 45.8 % (ref 36.5–49.3)
HCV AB SER QL: NORMAL
HGB BLD-MCNC: 14.6 G/DL (ref 12–17)
HGB UR QL STRIP.AUTO: NEGATIVE
IMM GRANULOCYTES # BLD AUTO: 0.02 THOUSAND/UL (ref 0–0.2)
IMM GRANULOCYTES NFR BLD AUTO: 0 % (ref 0–2)
KETONES UR STRIP-MCNC: NEGATIVE MG/DL
LEUKOCYTE ESTERASE UR QL STRIP: NEGATIVE
LYMPHOCYTES # BLD AUTO: 1.96 THOUSANDS/ΜL (ref 0.6–4.47)
LYMPHOCYTES NFR BLD AUTO: 40 % (ref 14–44)
MCH RBC QN AUTO: 30 PG (ref 26.8–34.3)
MCHC RBC AUTO-ENTMCNC: 31.9 G/DL (ref 31.4–37.4)
MCV RBC AUTO: 94 FL (ref 82–98)
MONOCYTES # BLD AUTO: 0.62 THOUSAND/ΜL (ref 0.17–1.22)
MONOCYTES NFR BLD AUTO: 13 % (ref 4–12)
NEUTROPHILS # BLD AUTO: 2.07 THOUSANDS/ΜL (ref 1.85–7.62)
NEUTS SEG NFR BLD AUTO: 42 % (ref 43–75)
NITRITE UR QL STRIP: NEGATIVE
NRBC BLD AUTO-RTO: 0 /100 WBCS
PH UR STRIP.AUTO: 6 [PH]
PLATELET # BLD AUTO: 210 THOUSANDS/UL (ref 149–390)
PMV BLD AUTO: 10.9 FL (ref 8.9–12.7)
POTASSIUM SERPL-SCNC: 4.6 MMOL/L (ref 3.5–5.3)
PROT SERPL-MCNC: 8 G/DL (ref 6.4–8.2)
PROT UR STRIP-MCNC: NEGATIVE MG/DL
RBC # BLD AUTO: 4.87 MILLION/UL (ref 3.88–5.62)
SODIUM SERPL-SCNC: 138 MMOL/L (ref 136–145)
SP GR UR STRIP.AUTO: 1.03 (ref 1–1.03)
UROBILINOGEN UR QL STRIP.AUTO: 0.2 E.U./DL
WBC # BLD AUTO: 4.92 THOUSAND/UL (ref 4.31–10.16)

## 2021-08-18 PROCEDURE — 82553 CREATINE MB FRACTION: CPT

## 2021-08-18 PROCEDURE — 84154 ASSAY OF PSA FREE: CPT

## 2021-08-18 PROCEDURE — 36415 COLL VENOUS BLD VENIPUNCTURE: CPT

## 2021-08-18 PROCEDURE — 81003 URINALYSIS AUTO W/O SCOPE: CPT | Performed by: INTERNAL MEDICINE

## 2021-08-18 PROCEDURE — 82550 ASSAY OF CK (CPK): CPT

## 2021-08-18 PROCEDURE — 86803 HEPATITIS C AB TEST: CPT

## 2021-08-18 PROCEDURE — 85025 COMPLETE CBC W/AUTO DIFF WBC: CPT

## 2021-08-18 PROCEDURE — 80053 COMPREHEN METABOLIC PANEL: CPT

## 2021-08-18 PROCEDURE — 84153 ASSAY OF PSA TOTAL: CPT

## 2021-08-18 PROCEDURE — 86140 C-REACTIVE PROTEIN: CPT

## 2021-08-19 LAB
PSA FREE MFR SERPL: 21.3 %
PSA FREE SERPL-MCNC: 0.17 NG/ML
PSA SERPL-MCNC: 0.8 NG/ML (ref 0–4)

## 2021-10-05 ENCOUNTER — TELEPHONE (OUTPATIENT)
Dept: GASTROENTEROLOGY | Facility: CLINIC | Age: 46
End: 2021-10-05

## 2021-10-07 ENCOUNTER — TELEMEDICINE (OUTPATIENT)
Dept: INTERNAL MEDICINE CLINIC | Facility: CLINIC | Age: 46
End: 2021-10-07
Payer: COMMERCIAL

## 2021-10-07 DIAGNOSIS — B34.9 VIRAL INFECTION, UNSPECIFIED: Primary | ICD-10-CM

## 2021-10-07 PROCEDURE — U0003 INFECTIOUS AGENT DETECTION BY NUCLEIC ACID (DNA OR RNA); SEVERE ACUTE RESPIRATORY SYNDROME CORONAVIRUS 2 (SARS-COV-2) (CORONAVIRUS DISEASE [COVID-19]), AMPLIFIED PROBE TECHNIQUE, MAKING USE OF HIGH THROUGHPUT TECHNOLOGIES AS DESCRIBED BY CMS-2020-01-R: HCPCS | Performed by: NURSE PRACTITIONER

## 2021-10-07 PROCEDURE — 99213 OFFICE O/P EST LOW 20 MIN: CPT | Performed by: NURSE PRACTITIONER

## 2021-10-07 PROCEDURE — U0005 INFEC AGEN DETEC AMPLI PROBE: HCPCS | Performed by: NURSE PRACTITIONER

## 2021-10-08 LAB — SARS-COV-2 RNA RESP QL NAA+PROBE: NEGATIVE

## 2021-10-26 ENCOUNTER — OFFICE VISIT (OUTPATIENT)
Dept: CARDIOLOGY CLINIC | Facility: CLINIC | Age: 46
End: 2021-10-26
Payer: COMMERCIAL

## 2021-10-26 VITALS
HEIGHT: 74 IN | WEIGHT: 227.9 LBS | SYSTOLIC BLOOD PRESSURE: 118 MMHG | BODY MASS INDEX: 29.25 KG/M2 | HEART RATE: 53 BPM | DIASTOLIC BLOOD PRESSURE: 82 MMHG

## 2021-10-26 DIAGNOSIS — Z00.00 HEALTHCARE MAINTENANCE: ICD-10-CM

## 2021-10-26 DIAGNOSIS — I10 PRIMARY HYPERTENSION: Primary | ICD-10-CM

## 2021-10-26 PROCEDURE — 99212 OFFICE O/P EST SF 10 MIN: CPT | Performed by: INTERNAL MEDICINE

## 2021-10-26 PROCEDURE — 3008F BODY MASS INDEX DOCD: CPT | Performed by: INTERNAL MEDICINE

## 2021-10-26 PROCEDURE — 4004F PT TOBACCO SCREEN RCVD TLK: CPT | Performed by: INTERNAL MEDICINE

## 2021-10-26 PROCEDURE — 93000 ELECTROCARDIOGRAM COMPLETE: CPT | Performed by: INTERNAL MEDICINE

## 2021-11-24 ENCOUNTER — CONSULT (OUTPATIENT)
Dept: GASTROENTEROLOGY | Facility: CLINIC | Age: 46
End: 2021-11-24
Payer: COMMERCIAL

## 2021-11-24 VITALS
TEMPERATURE: 97.1 F | WEIGHT: 231 LBS | SYSTOLIC BLOOD PRESSURE: 120 MMHG | BODY MASS INDEX: 29.65 KG/M2 | DIASTOLIC BLOOD PRESSURE: 70 MMHG | OXYGEN SATURATION: 98 % | HEIGHT: 74 IN | HEART RATE: 74 BPM

## 2021-11-24 DIAGNOSIS — K21.9 GASTROESOPHAGEAL REFLUX DISEASE, UNSPECIFIED WHETHER ESOPHAGITIS PRESENT: Primary | ICD-10-CM

## 2021-11-24 DIAGNOSIS — K92.2 LOWER GI BLEEDING: ICD-10-CM

## 2021-11-24 PROCEDURE — 99243 OFF/OP CNSLTJ NEW/EST LOW 30: CPT | Performed by: PHYSICIAN ASSISTANT

## 2021-11-24 PROCEDURE — 3008F BODY MASS INDEX DOCD: CPT | Performed by: INTERNAL MEDICINE

## 2021-11-24 RX ORDER — SODIUM, POTASSIUM,MAG SULFATES 17.5-3.13G
SOLUTION, RECONSTITUTED, ORAL ORAL
Qty: 354 ML | Refills: 0 | Status: SHIPPED | OUTPATIENT
Start: 2021-11-24 | End: 2021-12-22 | Stop reason: HOSPADM

## 2021-11-27 DIAGNOSIS — I10 ESSENTIAL HYPERTENSION: ICD-10-CM

## 2021-11-27 RX ORDER — AMLODIPINE BESYLATE 5 MG/1
TABLET ORAL
Qty: 90 TABLET | Refills: 3 | Status: SHIPPED | OUTPATIENT
Start: 2021-11-27

## 2021-12-07 ENCOUNTER — TELEPHONE (OUTPATIENT)
Dept: PREADMISSION TESTING | Facility: HOSPITAL | Age: 46
End: 2021-12-07

## 2021-12-09 ENCOUNTER — TELEPHONE (OUTPATIENT)
Dept: INTERNAL MEDICINE CLINIC | Facility: CLINIC | Age: 46
End: 2021-12-09

## 2021-12-21 ENCOUNTER — TELEPHONE (OUTPATIENT)
Dept: GASTROENTEROLOGY | Facility: HOSPITAL | Age: 46
End: 2021-12-21

## 2021-12-22 ENCOUNTER — ANESTHESIA (OUTPATIENT)
Dept: GASTROENTEROLOGY | Facility: HOSPITAL | Age: 46
End: 2021-12-22

## 2021-12-22 ENCOUNTER — ANESTHESIA EVENT (OUTPATIENT)
Dept: GASTROENTEROLOGY | Facility: HOSPITAL | Age: 46
End: 2021-12-22

## 2021-12-22 ENCOUNTER — HOSPITAL ENCOUNTER (OUTPATIENT)
Dept: GASTROENTEROLOGY | Facility: HOSPITAL | Age: 46
Setting detail: OUTPATIENT SURGERY
Discharge: HOME | End: 2021-12-22
Attending: INTERNAL MEDICINE | Admitting: INTERNAL MEDICINE
Payer: COMMERCIAL

## 2021-12-22 VITALS
RESPIRATION RATE: 18 BRPM | WEIGHT: 230 LBS | TEMPERATURE: 97.1 F | HEIGHT: 74 IN | HEART RATE: 56 BPM | SYSTOLIC BLOOD PRESSURE: 116 MMHG | BODY MASS INDEX: 29.52 KG/M2 | DIASTOLIC BLOOD PRESSURE: 71 MMHG | OXYGEN SATURATION: 98 %

## 2021-12-22 DIAGNOSIS — K20.90 ESOPHAGITIS: Primary | ICD-10-CM

## 2021-12-22 DIAGNOSIS — K92.2 LOWER GI BLEEDING: ICD-10-CM

## 2021-12-22 DIAGNOSIS — K21.9 GASTROESOPHAGEAL REFLUX DISEASE, UNSPECIFIED WHETHER ESOPHAGITIS PRESENT: ICD-10-CM

## 2021-12-22 PROCEDURE — 88305 TISSUE EXAM BY PATHOLOGIST: CPT | Performed by: PATHOLOGY

## 2021-12-22 PROCEDURE — 88342 IMHCHEM/IMCYTCHM 1ST ANTB: CPT | Performed by: PATHOLOGY

## 2021-12-22 PROCEDURE — 43239 EGD BIOPSY SINGLE/MULTIPLE: CPT | Performed by: INTERNAL MEDICINE

## 2021-12-22 PROCEDURE — 45385 COLONOSCOPY W/LESION REMOVAL: CPT | Performed by: INTERNAL MEDICINE

## 2021-12-22 RX ORDER — SODIUM CHLORIDE 9 MG/ML
INJECTION, SOLUTION INTRAVENOUS CONTINUOUS PRN
Status: DISCONTINUED | OUTPATIENT
Start: 2021-12-22 | End: 2021-12-22

## 2021-12-22 RX ORDER — SODIUM CHLORIDE 9 MG/ML
50 INJECTION, SOLUTION INTRAVENOUS CONTINUOUS
Status: CANCELLED | OUTPATIENT
Start: 2021-12-22

## 2021-12-22 RX ORDER — LIDOCAINE HYDROCHLORIDE 10 MG/ML
0.5 INJECTION, SOLUTION EPIDURAL; INFILTRATION; INTRACAUDAL; PERINEURAL ONCE AS NEEDED
Status: DISCONTINUED | OUTPATIENT
Start: 2021-12-22 | End: 2021-12-26 | Stop reason: HOSPADM

## 2021-12-22 RX ORDER — PROPOFOL 10 MG/ML
INJECTION, EMULSION INTRAVENOUS AS NEEDED
Status: DISCONTINUED | OUTPATIENT
Start: 2021-12-22 | End: 2021-12-22

## 2021-12-22 RX ORDER — OMEPRAZOLE 20 MG/1
20 CAPSULE, DELAYED RELEASE ORAL 2 TIMES DAILY
Qty: 60 CAPSULE | Refills: 4 | Status: SHIPPED | OUTPATIENT
Start: 2021-12-22 | End: 2022-01-21

## 2021-12-22 RX ORDER — SODIUM CHLORIDE, SODIUM LACTATE, POTASSIUM CHLORIDE, CALCIUM CHLORIDE 600; 310; 30; 20 MG/100ML; MG/100ML; MG/100ML; MG/100ML
125 INJECTION, SOLUTION INTRAVENOUS CONTINUOUS
Status: DISCONTINUED | OUTPATIENT
Start: 2021-12-22 | End: 2021-12-26 | Stop reason: HOSPADM

## 2021-12-22 RX ORDER — LIDOCAINE HYDROCHLORIDE 10 MG/ML
INJECTION, SOLUTION EPIDURAL; INFILTRATION; INTRACAUDAL; PERINEURAL AS NEEDED
Status: DISCONTINUED | OUTPATIENT
Start: 2021-12-22 | End: 2021-12-22

## 2021-12-22 RX ADMIN — PROPOFOL 50 MG: 10 INJECTION, EMULSION INTRAVENOUS at 14:20

## 2021-12-22 RX ADMIN — PROPOFOL 50 MG: 10 INJECTION, EMULSION INTRAVENOUS at 14:18

## 2021-12-22 RX ADMIN — PROPOFOL 50 MG: 10 INJECTION, EMULSION INTRAVENOUS at 14:16

## 2021-12-22 RX ADMIN — PROPOFOL 50 MG: 10 INJECTION, EMULSION INTRAVENOUS at 14:13

## 2021-12-22 RX ADMIN — PROPOFOL 50 MG: 10 INJECTION, EMULSION INTRAVENOUS at 14:07

## 2021-12-22 RX ADMIN — PROPOFOL 50 MG: 10 INJECTION, EMULSION INTRAVENOUS at 14:05

## 2021-12-22 RX ADMIN — PROPOFOL 150 MG: 10 INJECTION, EMULSION INTRAVENOUS at 14:01

## 2021-12-22 RX ADMIN — PROPOFOL 50 MG: 10 INJECTION, EMULSION INTRAVENOUS at 14:27

## 2021-12-22 RX ADMIN — PROPOFOL 50 MG: 10 INJECTION, EMULSION INTRAVENOUS at 14:31

## 2021-12-22 RX ADMIN — PROPOFOL 100 MG: 10 INJECTION, EMULSION INTRAVENOUS at 14:33

## 2021-12-22 RX ADMIN — LIDOCAINE HYDROCHLORIDE 50 MG: 10 INJECTION, SOLUTION EPIDURAL; INFILTRATION; INTRACAUDAL; PERINEURAL at 14:01

## 2021-12-22 RX ADMIN — SODIUM CHLORIDE: 0.9 INJECTION, SOLUTION INTRAVENOUS at 13:54

## 2021-12-22 RX ADMIN — PROPOFOL 50 MG: 10 INJECTION, EMULSION INTRAVENOUS at 14:09

## 2021-12-22 RX ADMIN — PROPOFOL 50 MG: 10 INJECTION, EMULSION INTRAVENOUS at 14:03

## 2021-12-22 RX ADMIN — PROPOFOL 50 MG: 10 INJECTION, EMULSION INTRAVENOUS at 14:23

## 2021-12-22 RX ADMIN — SODIUM CHLORIDE, SODIUM LACTATE, POTASSIUM CHLORIDE, AND CALCIUM CHLORIDE 125 ML/HR: .6; .31; .03; .02 INJECTION, SOLUTION INTRAVENOUS at 12:37

## 2022-02-23 ENCOUNTER — OFFICE VISIT (OUTPATIENT)
Dept: INTERNAL MEDICINE CLINIC | Facility: OTHER | Age: 47
End: 2022-02-23
Payer: COMMERCIAL

## 2022-02-23 VITALS
BODY MASS INDEX: 29.52 KG/M2 | DIASTOLIC BLOOD PRESSURE: 82 MMHG | TEMPERATURE: 99.7 F | HEART RATE: 60 BPM | SYSTOLIC BLOOD PRESSURE: 120 MMHG | WEIGHT: 230 LBS | OXYGEN SATURATION: 100 % | HEIGHT: 74 IN

## 2022-02-23 DIAGNOSIS — R07.89 OTHER CHEST PAIN: ICD-10-CM

## 2022-02-23 DIAGNOSIS — B34.9 VIRAL ILLNESS: Primary | ICD-10-CM

## 2022-02-23 LAB
SARS-COV-2 AG UPPER RESP QL IA: NEGATIVE
VALID CONTROL: NORMAL

## 2022-02-23 PROCEDURE — 4004F PT TOBACCO SCREEN RCVD TLK: CPT | Performed by: NURSE PRACTITIONER

## 2022-02-23 PROCEDURE — 93000 ELECTROCARDIOGRAM COMPLETE: CPT | Performed by: NURSE PRACTITIONER

## 2022-02-23 PROCEDURE — 87811 SARS-COV-2 COVID19 W/OPTIC: CPT | Performed by: NURSE PRACTITIONER

## 2022-02-23 PROCEDURE — 99213 OFFICE O/P EST LOW 20 MIN: CPT | Performed by: NURSE PRACTITIONER

## 2022-02-23 PROCEDURE — 3008F BODY MASS INDEX DOCD: CPT | Performed by: NURSE PRACTITIONER

## 2022-02-23 PROCEDURE — 3725F SCREEN DEPRESSION PERFORMED: CPT | Performed by: NURSE PRACTITIONER

## 2022-02-23 RX ORDER — DICYCLOMINE HCL 20 MG
20 TABLET ORAL EVERY 6 HOURS PRN
Qty: 30 TABLET | Refills: 0 | Status: SHIPPED | OUTPATIENT
Start: 2022-02-23

## 2022-02-23 RX ORDER — DOXYCYCLINE HYCLATE 100 MG/1
100 CAPSULE ORAL EVERY 12 HOURS
COMMUNITY
Start: 2022-02-02

## 2022-02-23 NOTE — PROGRESS NOTES
Assessment/Plan:    Problem List Items Addressed This Visit     None      Visit Diagnoses     Viral illness    -  Primary    gastroenteritis   advised bland diet, hydration, rest   follow up if symptoms worsen or do not improve   rapid covid negative     Relevant Medications    dicyclomine (BENTYL) 20 mg tablet    Other Relevant Orders    POCT Rapid Covid Ag (Completed)    Other chest pain        EKG normal sinus rhythm   likely myalgias from viral infection  advised to alternate tylenol and ibuprofen     Relevant Orders    POCT ECG (Completed)               M*Modal software was used to dictate this note  It may contain errors with dictating incorrect words or incorrect spelling  Please contact the provider directly with any questions  Subjective:      Patient ID: Christiano Abraham is a 42 y o  male  HPI    Patient presents today with feeling ill for the last 36 hours  He started with feeling run down and fatigued yesterday evening, then started with severe diarrhea through the evening into the night  He denies any abdominal pain  Low grade fevers - 99  Last episode of diarrhea was 4 hours ago this morning  He took pepto last night and 2 imodium this morning     This morning he started with chest "aching" throughout his entire chest and into his abdomen  He is fully vaccinated and boosted  No known covid exposure  They were at a to[employer] tournament over the weekend  He took a rapid test which was negative, his son was also negative     The following portions of the patient's history were reviewed and updated as appropriate: allergies, current medications, past family history, past medical history, past social history, past surgical history and problem list     Review of Systems   Constitutional: Positive for diaphoresis and fatigue  Negative for chills and fever  HENT: Negative for congestion, ear discharge, rhinorrhea, sinus pressure, sinus pain, sneezing and sore throat      Respiratory: Positive for chest tightness  Negative for cough, shortness of breath and wheezing  Cardiovascular: Positive for chest pain (aching)  Negative for palpitations and leg swelling  Gastrointestinal: Positive for diarrhea (last episode 4 hours ago)  Negative for abdominal distention, abdominal pain, constipation, nausea and vomiting  Neurological: Negative for headaches  Past Medical History:   Diagnosis Date    Hypertension 3/23/2015         Current Outpatient Medications:     amLODIPine (NORVASC) 5 mg tablet, TAKE 1 TABLET DAILY, Disp: 90 tablet, Rfl: 3    doxycycline hyclate (VIBRAMYCIN) 100 mg capsule, Take 100 mg by mouth every 12 (twelve) hours, Disp: , Rfl:     dicyclomine (BENTYL) 20 mg tablet, Take 1 tablet (20 mg total) by mouth every 6 (six) hours as needed (abdominal cramping), Disp: 30 tablet, Rfl: 0    omeprazole (PriLOSEC) 20 mg delayed release capsule, Take 1 capsule (20 mg total) by mouth 2 (two) times a day, Disp: 60 capsule, Rfl: 4    valACYclovir (VALTREX) 1,000 mg tablet, Take 2 tablets twice a day for 1 day at first sign of outbreak, Disp: 30 tablet, Rfl: 0    Allergies   Allergen Reactions    Amoxicillin Drowsiness    Penicillins        Social History   Past Surgical History:   Procedure Laterality Date    APPENDECTOMY      WISDOM TOOTH EXTRACTION       Family History   Problem Relation Age of Onset    Hypertension Mother     Anxiety disorder Mother     Irregular heart beat Mother         pacemaker    Colon polyps Mother     Diabetes Maternal Grandfather     No Known Problems Father        Objective:  /82 (BP Location: Left arm, Patient Position: Lying left side, Cuff Size: Standard)   Pulse 60   Temp 99 7 °F (37 6 °C) (Temporal)   Ht 6' 2" (1 88 m)   Wt 104 kg (230 lb)   SpO2 100%   BMI 29 53 kg/m²      Physical Exam  Vitals reviewed  Constitutional:       General: He is not in acute distress  Appearance: Normal appearance  He is not diaphoretic  HENT:      Head: Normocephalic and atraumatic  Eyes:      Extraocular Movements: Extraocular movements intact  Conjunctiva/sclera: Conjunctivae normal       Pupils: Pupils are equal, round, and reactive to light  Cardiovascular:      Rate and Rhythm: Normal rate and regular rhythm  Heart sounds: Normal heart sounds  No murmur heard  Pulmonary:      Effort: Pulmonary effort is normal  No respiratory distress  Breath sounds: Normal breath sounds  No wheezing, rhonchi or rales  Abdominal:      General: Bowel sounds are normal  There is no distension  Palpations: Abdomen is soft  There is no mass  Tenderness: There is no abdominal tenderness  There is no guarding  Neurological:      Mental Status: He is alert and oriented to person, place, and time  Mental status is at baseline     Psychiatric:         Mood and Affect: Mood normal          Behavior: Behavior normal

## 2022-06-28 ENCOUNTER — OFFICE VISIT (OUTPATIENT)
Dept: INTERNAL MEDICINE CLINIC | Facility: OTHER | Age: 47
End: 2022-06-28
Payer: COMMERCIAL

## 2022-06-28 VITALS
HEIGHT: 74 IN | DIASTOLIC BLOOD PRESSURE: 72 MMHG | HEART RATE: 70 BPM | BODY MASS INDEX: 29.39 KG/M2 | OXYGEN SATURATION: 98 % | WEIGHT: 229 LBS | TEMPERATURE: 97.5 F | SYSTOLIC BLOOD PRESSURE: 110 MMHG

## 2022-06-28 DIAGNOSIS — H91.93 DECREASED HEARING OF BOTH EARS: ICD-10-CM

## 2022-06-28 DIAGNOSIS — B00.1 HERPES LABIALIS: ICD-10-CM

## 2022-06-28 DIAGNOSIS — K14.6 SORENESS OF TONGUE: Primary | ICD-10-CM

## 2022-06-28 DIAGNOSIS — K14.6 TONGUE SORE: ICD-10-CM

## 2022-06-28 PROCEDURE — 99213 OFFICE O/P EST LOW 20 MIN: CPT | Performed by: NURSE PRACTITIONER

## 2022-06-28 PROCEDURE — 3008F BODY MASS INDEX DOCD: CPT | Performed by: NURSE PRACTITIONER

## 2022-06-28 PROCEDURE — 4004F PT TOBACCO SCREEN RCVD TLK: CPT | Performed by: NURSE PRACTITIONER

## 2022-06-28 RX ORDER — VALACYCLOVIR HYDROCHLORIDE 1 G/1
TABLET, FILM COATED ORAL
Qty: 30 TABLET | Refills: 0 | Status: SHIPPED | OUTPATIENT
Start: 2022-06-28 | End: 2023-01-21

## 2022-06-28 NOTE — PROGRESS NOTES
Assessment/Plan:    Decreased hearing of both ears  Will give referral to ENT  Tongue sore  Will give referral to ENT  Encouraged warm salt water rinses  Diagnoses and all orders for this visit:    Soreness of tongue    Herpes labialis  -     valACYclovir (VALTREX) 1,000 mg tablet; Take 2 tablets twice a day for 1 day at first sign of outbreak    Tongue sore  -     Ambulatory Referral to Otolaryngology; Future    Decreased hearing of both ears  -     Ambulatory Referral to Otolaryngology; Future          Subjective:      Patient ID: Marielos Crandall is a 42 y o  male  Patient presents with a "lump on his tongue"  He noticed it about two weeks ago, had seen his dentist at that time and they recommended that he continues to monitor and if it does not go way that he should be seen by his PCP  In the meantime patient had scheduled an oral surgery appointment and was not able to get until October of this year  He denies any trouble swallowing from the sore, and denies pain from the sore  He reports that in the summer he drinks alcohol drinks about 2-10 about 5 days a weeks  Smokes marijuana a few times a week  Smokes ciagrs every once in a while  Chewing tobacco in the past      He also reports decreased hearing for a long time now, he reports in the past he had hearing screening with no abnormal results  He reports that he feels that his hearing has been worsening over time  The following portions of the patient's history were reviewed and updated as appropriate: allergies, current medications, past family history, past medical history, past social history, past surgical history and problem list     Review of Systems   Constitutional: Negative for activity change, appetite change, chills, diaphoresis and fever  HENT: Negative for congestion, ear discharge, ear pain, postnasal drip, rhinorrhea, sinus pressure, sinus pain and sore throat      Eyes: Negative for pain, discharge, itching and visual disturbance  Respiratory: Negative for cough, chest tightness, shortness of breath and wheezing  Cardiovascular: Negative for chest pain, palpitations and leg swelling  Gastrointestinal: Negative for abdominal pain, constipation, diarrhea, nausea and vomiting  Endocrine: Negative for polydipsia, polyphagia and polyuria  Genitourinary: Negative for difficulty urinating, dysuria and urgency  Musculoskeletal: Negative for arthralgias, back pain and neck pain  Skin: Negative for rash and wound  Neurological: Negative for dizziness, weakness, numbness and headaches           Past Medical History:   Diagnosis Date    Hypertension 3/23/2015         Current Outpatient Medications:     amLODIPine (NORVASC) 5 mg tablet, TAKE 1 TABLET DAILY, Disp: 90 tablet, Rfl: 3    valACYclovir (VALTREX) 1,000 mg tablet, Take 2 tablets twice a day for 1 day at first sign of outbreak, Disp: 30 tablet, Rfl: 0    dicyclomine (BENTYL) 20 mg tablet, Take 1 tablet (20 mg total) by mouth every 6 (six) hours as needed (abdominal cramping) (Patient not taking: Reported on 6/28/2022), Disp: 30 tablet, Rfl: 0    doxycycline hyclate (VIBRAMYCIN) 100 mg capsule, Take 100 mg by mouth every 12 (twelve) hours (Patient not taking: Reported on 6/28/2022), Disp: , Rfl:     omeprazole (PriLOSEC) 20 mg delayed release capsule, Take 1 capsule (20 mg total) by mouth 2 (two) times a day (Patient not taking: Reported on 6/28/2022), Disp: 60 capsule, Rfl: 4    Allergies   Allergen Reactions    Amoxicillin Drowsiness    Penicillins        Social History   Past Surgical History:   Procedure Laterality Date    APPENDECTOMY      WISDOM TOOTH EXTRACTION       Family History   Problem Relation Age of Onset    Hypertension Mother     Anxiety disorder Mother     Irregular heart beat Mother         pacemaker    Colon polyps Mother     Diabetes Maternal Grandfather     No Known Problems Father        Objective:  /72 (BP Location: Left arm, Patient Position: Sitting, Cuff Size: Adult)   Pulse 70   Temp 97 5 °F (36 4 °C) (Temporal)   Ht 6' 2" (1 88 m)   Wt 104 kg (229 lb)   SpO2 98%   BMI 29 40 kg/m²     No results found for this or any previous visit (from the past 1344 hour(s))  Physical Exam  Constitutional:       General: He is not in acute distress  Appearance: He is well-developed  He is not diaphoretic  HENT:      Head: Normocephalic and atraumatic  Right Ear: External ear normal  Tympanic membrane is scarred  Tympanic membrane is not erythematous or bulging  Left Ear: External ear normal  Tympanic membrane is not scarred, erythematous or bulging  Nose: Nose normal       Mouth/Throat:      Mouth: Oral lesions present  Pharynx: No oropharyngeal exudate  Eyes:      General:         Right eye: No discharge  Left eye: No discharge  Conjunctiva/sclera: Conjunctivae normal       Pupils: Pupils are equal, round, and reactive to light  Neck:      Thyroid: No thyromegaly  Cardiovascular:      Rate and Rhythm: Normal rate and regular rhythm  Heart sounds: Normal heart sounds  No murmur heard  No friction rub  No gallop  Pulmonary:      Effort: Pulmonary effort is normal  No respiratory distress  Breath sounds: Normal breath sounds  No stridor  No wheezing or rales  Abdominal:      General: Bowel sounds are normal  There is no distension  Palpations: Abdomen is soft  Tenderness: There is no abdominal tenderness  Musculoskeletal:      Cervical back: Normal range of motion and neck supple  Lymphadenopathy:      Cervical: No cervical adenopathy  Skin:     General: Skin is warm and dry  Findings: No erythema or rash  Neurological:      Mental Status: He is alert and oriented to person, place, and time  Psychiatric:         Behavior: Behavior normal          Thought Content:  Thought content normal          Judgment: Judgment normal

## 2022-08-11 ENCOUNTER — RA CDI HCC (OUTPATIENT)
Dept: OTHER | Facility: HOSPITAL | Age: 47
End: 2022-08-11

## 2022-08-11 NOTE — PROGRESS NOTES
[facility]  coding opportunities       Chart reviewed, no opportunity found: CHART REVIEWED, NO OPPORTUNITY FOUND        Patients Insurance        Commercial Insurance: [address] [place] [address]

## 2022-08-17 ENCOUNTER — TELEPHONE (OUTPATIENT)
Dept: INTERNAL MEDICINE CLINIC | Facility: CLINIC | Age: 47
End: 2022-08-17

## 2022-08-17 ENCOUNTER — OFFICE VISIT (OUTPATIENT)
Dept: INTERNAL MEDICINE CLINIC | Facility: CLINIC | Age: 47
End: 2022-08-17
Payer: COMMERCIAL

## 2022-08-17 VITALS
OXYGEN SATURATION: 98 % | BODY MASS INDEX: 29.13 KG/M2 | TEMPERATURE: 97.9 F | HEIGHT: 74 IN | DIASTOLIC BLOOD PRESSURE: 84 MMHG | HEART RATE: 73 BPM | WEIGHT: 227 LBS | SYSTOLIC BLOOD PRESSURE: 118 MMHG

## 2022-08-17 DIAGNOSIS — R39.12 WEAK URINARY STREAM: ICD-10-CM

## 2022-08-17 DIAGNOSIS — Z13.0 SCREENING FOR DEFICIENCY ANEMIA: ICD-10-CM

## 2022-08-17 DIAGNOSIS — F41.9 ANXIETY: Primary | ICD-10-CM

## 2022-08-17 DIAGNOSIS — I10 PRIMARY HYPERTENSION: ICD-10-CM

## 2022-08-17 DIAGNOSIS — Z13.220 SCREENING FOR LIPID DISORDERS: ICD-10-CM

## 2022-08-17 PROBLEM — B34.9 VIRAL INFECTION, UNSPECIFIED: Status: RESOLVED | Noted: 2021-10-07 | Resolved: 2022-08-17

## 2022-08-17 PROCEDURE — 99214 OFFICE O/P EST MOD 30 MIN: CPT | Performed by: NURSE PRACTITIONER

## 2022-08-17 PROCEDURE — 3725F SCREEN DEPRESSION PERFORMED: CPT | Performed by: NURSE PRACTITIONER

## 2022-08-17 NOTE — PATIENT INSTRUCTIONS
- start sertraline 1/2 tablet for the first week, then increase to full tablet  - follow up in 1 month    - monitor blood pressure at home    - schedule with urology

## 2022-08-17 NOTE — TELEPHONE ENCOUNTER
Good morning! 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

## 2022-08-17 NOTE — TELEPHONE ENCOUNTER
There was a cancellation at the [facility] office, pt's appt booked for today at 2pm      Thank you

## 2022-08-17 NOTE — PROGRESS NOTES
Assessment/Plan:    Problem List Items Addressed This Visit        Cardiovascular and Mediastinum    Hypertension     - controlled on amlodipine 5 mg daily         Relevant Orders    Comprehensive metabolic panel       Other    Weak urinary stream     - follow-up with urology  - update PSA         Relevant Orders    Ambulatory Referral to Urology    PSA, Total Screen    UA w Reflex to Microscopic w Reflex to Culture -Lab Collect    Anxiety - Primary     - discussed will different treatment options with patient but would recommend SSRI  - patient is agreeable to start sertraline, 25 mg x 1 week then increase to 50 mg daily  - follow-up in 1 month  - medication side effects reviewed with patient         Relevant Medications    sertraline (Zoloft) 50 mg tablet    Other Relevant Orders    TSH, 3rd generation with Free T4 reflex      Other Visit Diagnoses     Screening for deficiency anemia        Relevant Orders    CBC and differential    Screening for lipid disorders        Relevant Orders    Lipid Panel with Direct LDL reflex               M*Modal software was used to dictate this note  It may contain errors with dictating incorrect words or incorrect spelling  Please contact the provider directly with any questions  Subjective:      Patient ID: Patrick Rene is a 42 y o  male  HPI     Patient presents today for routine follow-up and to discuss his increase in anxiety and stress  Anxiety and stress - He reports he has been feeling more stressed lately  He coaches baseball in the spring, football in the fall, he has his side pressure washing business, kids at home and works as a teacher in the fall  He cares a lot about providing for his family in doing things for them but at the same time it takes a toll on himself  He gets frustrated with his family at times and will yell or scream   He gets stressed out with his side business as well    He does smoke marijuana occasionally which he feels improved his symptoms  He also mentions that he has a weak urinary stream   The urine will be intermittent  He denies any other symptoms  His PSA level 1 year ago was normal      The following portions of the patient's history were reviewed and updated as appropriate: allergies, current medications, past family history, past medical history, past social history, past surgical history and problem list     Review of Systems   Constitutional: Negative for activity change, appetite change and unexpected weight change  Genitourinary: Positive for difficulty urinating  Negative for dysuria, frequency and hematuria  Psychiatric/Behavioral: Positive for agitation  Negative for dysphoric mood  The patient is nervous/anxious  Past Medical History:   Diagnosis Date    Hypertension 3/23/2015         Current Outpatient Medications:     amLODIPine (NORVASC) 5 mg tablet, TAKE 1 TABLET DAILY, Disp: 90 tablet, Rfl: 3    sertraline (Zoloft) 50 mg tablet, Take 1 tablet (50 mg total) by mouth daily, Disp: 30 tablet, Rfl: 1    valACYclovir (VALTREX) 1,000 mg tablet, Take 2 tablets twice a day for 1 day at first sign of outbreak, Disp: 30 tablet, Rfl: 0    Allergies   Allergen Reactions    Amoxicillin Drowsiness    Penicillins        Social History   Past Surgical History:   Procedure Laterality Date    APPENDECTOMY      WISDOM TOOTH EXTRACTION       Family History   Problem Relation Age of Onset    Hypertension Mother     Anxiety disorder Mother     Irregular heart beat Mother         pacemaker    Colon polyps Mother     Diabetes Maternal Grandfather     No Known Problems Father        Objective:  /84 (BP Location: Left arm, Patient Position: Sitting, Cuff Size: Adult)   Pulse 73   Temp 97 9 °F (36 6 °C) (Temporal)   Ht 6' 1 82" (1 875 m)   Wt 103 kg (227 lb)   SpO2 98%   BMI 29 29 kg/m²      Physical Exam  Vitals reviewed  Constitutional:       General: He is not in acute distress       Appearance: Normal appearance  He is not diaphoretic  HENT:      Head: Normocephalic and atraumatic  Eyes:      Extraocular Movements: Extraocular movements intact  Conjunctiva/sclera: Conjunctivae normal       Pupils: Pupils are equal, round, and reactive to light  Cardiovascular:      Rate and Rhythm: Normal rate and regular rhythm  Heart sounds: Normal heart sounds  No murmur heard  Pulmonary:      Effort: Pulmonary effort is normal  No respiratory distress  Breath sounds: Normal breath sounds  No wheezing, rhonchi or rales  Neurological:      Mental Status: He is alert  Mental status is at baseline     Psychiatric:         Mood and Affect: Mood normal          Behavior: Behavior normal

## 2022-08-18 PROBLEM — F41.9 ANXIETY: Status: ACTIVE | Noted: 2022-08-18

## 2022-08-18 NOTE — ASSESSMENT & PLAN NOTE
- discussed will different treatment options with patient but would recommend SSRI  - patient is agreeable to start sertraline, 25 mg x 1 week then increase to 50 mg daily  - follow-up in 1 month  - medication side effects reviewed with patient

## 2022-08-23 ENCOUNTER — TELEPHONE (OUTPATIENT)
Dept: INTERNAL MEDICINE CLINIC | Facility: OTHER | Age: 47
End: 2022-08-23

## 2022-08-23 NOTE — TELEPHONE ENCOUNTER
FYI-Patient calling that he will not be taking the sertraline, it did not make him feel well   Patient has an appt in September for his annual Physical

## 2022-09-28 ENCOUNTER — OFFICE VISIT (OUTPATIENT)
Dept: INTERNAL MEDICINE CLINIC | Facility: OTHER | Age: 47
End: 2022-09-28
Payer: COMMERCIAL

## 2022-09-28 VITALS
HEART RATE: 67 BPM | BODY MASS INDEX: 29.98 KG/M2 | WEIGHT: 233.6 LBS | OXYGEN SATURATION: 98 % | TEMPERATURE: 97.4 F | SYSTOLIC BLOOD PRESSURE: 130 MMHG | DIASTOLIC BLOOD PRESSURE: 92 MMHG | HEIGHT: 74 IN

## 2022-09-28 DIAGNOSIS — R33.9 INCOMPLETE BLADDER EMPTYING: ICD-10-CM

## 2022-09-28 DIAGNOSIS — Z23 NEED FOR INFLUENZA VACCINATION: ICD-10-CM

## 2022-09-28 DIAGNOSIS — B36.0 TINEA VERSICOLOR: ICD-10-CM

## 2022-09-28 DIAGNOSIS — Z23 NEED FOR TDAP VACCINATION: ICD-10-CM

## 2022-09-28 DIAGNOSIS — I10 PRIMARY HYPERTENSION: ICD-10-CM

## 2022-09-28 DIAGNOSIS — N39.43 POST-VOID DRIBBLING: ICD-10-CM

## 2022-09-28 DIAGNOSIS — Z00.00 ANNUAL PHYSICAL EXAM: Primary | ICD-10-CM

## 2022-09-28 DIAGNOSIS — H91.93 HEARING TROUBLE, BILATERAL: ICD-10-CM

## 2022-09-28 PROBLEM — R14.3 EXCESSIVE FLATUS: Status: RESOLVED | Noted: 2021-08-17 | Resolved: 2022-09-28

## 2022-09-28 PROCEDURE — 99396 PREV VISIT EST AGE 40-64: CPT | Performed by: NURSE PRACTITIONER

## 2022-09-28 PROCEDURE — 90686 IIV4 VACC NO PRSV 0.5 ML IM: CPT

## 2022-09-28 PROCEDURE — 90472 IMMUNIZATION ADMIN EACH ADD: CPT

## 2022-09-28 PROCEDURE — 90715 TDAP VACCINE 7 YRS/> IM: CPT

## 2022-09-28 PROCEDURE — 90471 IMMUNIZATION ADMIN: CPT

## 2022-09-28 RX ORDER — KETOCONAZOLE 20 MG/ML
1 SHAMPOO TOPICAL ONCE
Qty: 120 ML | Refills: 1 | Status: SHIPPED | OUTPATIENT
Start: 2022-09-28 | End: 2022-09-28

## 2022-09-28 NOTE — PROGRESS NOTES
ADULT ANNUAL [address] [facility] [place]    NAME: Blanca Dickey  AGE: 41 y o  SEX: male  : 1975     DATE: 2022     Assessment and Plan:     Problem List Items Addressed This Visit        Cardiovascular and Mediastinum    Hypertension     - continue amlodipine 5 mg daily  - follow-up for recheck in 1 month            Nervous and Auditory    Hearing trouble, bilateral    Relevant Orders    Ambulatory Referral to Audiology       Musculoskeletal and Integument    Tinea versicolor     - start ketoconazole shampoo         Relevant Medications    ketoconazole (NIZORAL) 2 % shampoo       Other    Post-void dribbling     - check PSA  - follow-up with Urology         Relevant Orders    PSA Total, Diagnostic    Ambulatory Referral to Urology    Incomplete bladder emptying     - check PSA  - follow-up with Urology  - decrease caffeine         Relevant Orders    PSA Total, Diagnostic    Ambulatory Referral to Urology      Other Visit Diagnoses     Annual physical exam    -  Primary    Healthy 60-year-old male  Check baseline labs  Work on stress reduction and anxiety  Follow-up 1 month    Need for Tdap vaccination        Relevant Orders    TDAP VACCINE GREATER THAN OR EQUAL TO 6YO IM (Completed)    Need for influenza vaccination        Relevant Orders    influenza vaccine, quadrivalent, 0 5 mL, preservative-free, for adult and pediatric patients 6 mos+ (AFLURIA, FLUARIX, FLULAVAL, FLUZONE) (Completed)          Immunizations and preventive care screenings were discussed with patient today  Appropriate education was printed on patient's after visit summary  Return in about 1 year (around 2023) for Annual physical      Chief Complaint:     Chief Complaint   Patient presents with    Physical Exam     Pt is here for an annual physical  Pt tool one zoloft and stopped taking it  Did not like the way it made him feel    Made him feel very seema  History of Present Illness:     Adult Annual Physical   Patient here for a comprehensive physical exam  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, but 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  He is taking amlodipine 5 mg daily for HTN  He is not monitoring his BP at home  Diet and Physical Activity  · Diet/Nutrition: well balanced diet  · Exercise: very active lifestyle, typically working out 3-4 times a week  BMI Counseling: Body mass index is 29 66 kg/m²  The BMI is above normal  Nutrition recommendations include 3-5 servings of fruits/vegetables daily, moderation in carbohydrate intake, increasing intake of lean protein and reducing intake of saturated fat and trans fat  Exercise recommendations include moderate aerobic physical activity for 150 minutes/week  inaccurate BMI due to muscle mass     Depression Screening  PHQ-2/9 Depression Screening         General Health  · Sleep: sleeps well  · Hearing: feels he has always had trouble hearing  he states he has had hearing evaluations in the past which was normal     · Vision: no vision problems and most recent eye exam >1 year ago  · Dental: regular dental visits   Health  · Symptoms include: post-void dribbling  incomplete emptying  Review of Systems:     Review of Systems   Constitutional: Negative for activity change, appetite change, chills, diaphoresis, fatigue, fever and unexpected weight change  Eyes: Negative for visual disturbance  Respiratory: Negative for cough, chest tightness, shortness of breath and wheezing  Cardiovascular: Negative for chest pain and palpitations  Gastrointestinal: Negative for abdominal distention, abdominal pain, blood in stool, constipation, diarrhea, nausea and vomiting  Genitourinary: Positive for frequency  Negative for difficulty urinating, dysuria, hematuria, penile swelling, scrotal swelling and testicular pain          Incomplete emptying   Skin: Positive for rash (hypopigmentation arms and back)  Neurological: Negative for dizziness, seizures, syncope, light-headedness and headaches  Psychiatric/Behavioral: Negative for dysphoric mood and sleep disturbance  The patient is nervous/anxious  Past Medical History:     Past Medical History:   Diagnosis Date    Hypertension 3/23/2015      Past Surgical History:     Past Surgical History:   Procedure Laterality Date    APPENDECTOMY      WISDOM TOOTH EXTRACTION        Family History:     Family History   Problem Relation Age of Onset    Hypertension Mother     Anxiety disorder Mother     Irregular heart beat Mother         pacemaker    Colon polyps Mother     Diabetes Maternal Grandfather     No Known Problems Father       Social History:     Social History     Socioeconomic History    Marital status: /Civil Union     Spouse name: None    Number of children: None    Years of education: None    Highest education level: None   Occupational History    None   Tobacco Use    Smoking status: Current Some Day Smoker     Types: Cigars    Smokeless tobacco: Former User     Quit date: 2021    Tobacco comment: occasional cigar   Vaping Use    Vaping Use: Some days    Substances: THC   Substance and Sexual Activity    Alcohol use: Yes     Comment: 25 drinks per week - IPA beer and whisky    Drug use: Yes     Types:  Other     Comment: Vapes THC    Sexual activity: Not Currently   Other Topics Concern    None   Social History Narrative    None     Social Determinants of Health     Financial Resource Strain: Not on file   Food Insecurity: Not on file   Transportation Needs: Not on file   Physical Activity: Not on file   Stress: Not on file   Social Connections: Not on file   Intimate Partner Violence: Not on file   Housing Stability: Not on file      Current Medications:     Current Outpatient Medications   Medication Sig Dispense Refill    amLODIPine (NORVASC) 5 mg tablet TAKE 1 TABLET DAILY 90 tablet 3  ketoconazole (NIZORAL) 2 % shampoo Apply 1 application topically once for 1 dose 120 mL 1    valACYclovir (VALTREX) 1,000 mg tablet Take 2 tablets twice a day for 1 day at first sign of outbreak 30 tablet 0     No current facility-administered medications for this visit  Allergies: Allergies   Allergen Reactions    Amoxicillin Drowsiness    Penicillins       Physical Exam:     /92 (BP Location: Left arm, Patient Position: Sitting, Cuff Size: Large)   Pulse 67   Temp (!) 97 4 °F (36 3 °C) (Tympanic)   Ht 6' 2 41" (1 89 m)   Wt 106 kg (233 lb 9 6 oz)   SpO2 98% Comment: ra  BMI 29 66 kg/m²     Physical Exam  Vitals reviewed  Constitutional:       General: He is not in acute distress  Appearance: Normal appearance  He is not diaphoretic  HENT:      Head: Normocephalic and atraumatic  Right Ear: Tympanic membrane and external ear normal       Left Ear: Tympanic membrane and external ear normal       Nose: Nose normal  No congestion or rhinorrhea  Mouth/Throat:      Mouth: Mucous membranes are moist       Pharynx: Oropharynx is clear  No oropharyngeal exudate or posterior oropharyngeal erythema  Eyes:      Extraocular Movements: Extraocular movements intact  Conjunctiva/sclera: Conjunctivae normal       Pupils: Pupils are equal, round, and reactive to light  Neck:      Thyroid: No thyroid mass, thyromegaly or thyroid tenderness  Vascular: No carotid bruit  Cardiovascular:      Rate and Rhythm: Normal rate and regular rhythm  Heart sounds: Normal heart sounds  No murmur heard  Pulmonary:      Effort: Pulmonary effort is normal  No respiratory distress  Breath sounds: Normal breath sounds  No wheezing, rhonchi or rales  Chest:   Breasts:      Right: No axillary adenopathy or supraclavicular adenopathy  Left: No axillary adenopathy or supraclavicular adenopathy  Abdominal:      General: Bowel sounds are normal  There is no distension  Palpations: Abdomen is soft  There is no mass  Tenderness: There is no abdominal tenderness  There is no guarding  Genitourinary:     Comments: Pt declined  exam  Musculoskeletal:      Cervical back: Neck supple  Right lower leg: No edema  Left lower leg: No edema  Lymphadenopathy:      Cervical: No cervical adenopathy  Upper Body:      Right upper body: No supraclavicular, axillary, pectoral or epitrochlear adenopathy  Left upper body: No supraclavicular, axillary, pectoral or epitrochlear adenopathy  Skin:     General: Skin is warm and dry  Neurological:      Mental Status: He is alert and oriented to person, place, and time  Mental status is at baseline  Motor: No weakness  Gait: Gait normal    Psychiatric:         Mood and Affect: Mood normal          Behavior: Behavior normal          Thought Content:  Thought content normal          Judgment: Judgment normal           Kristel Romo, [address] [facility] PRIMARY [facility]ARE [place]ale[place] Howell

## 2022-10-15 ENCOUNTER — APPOINTMENT (OUTPATIENT)
Dept: LAB | Facility: IMAGING CENTER | Age: 47
End: 2022-10-15
Payer: COMMERCIAL

## 2022-10-15 DIAGNOSIS — Z13.220 SCREENING FOR LIPID DISORDERS: ICD-10-CM

## 2022-10-15 DIAGNOSIS — R33.9 INCOMPLETE BLADDER EMPTYING: ICD-10-CM

## 2022-10-15 DIAGNOSIS — I10 PRIMARY HYPERTENSION: ICD-10-CM

## 2022-10-15 DIAGNOSIS — N39.43 POST-VOID DRIBBLING: ICD-10-CM

## 2022-10-15 DIAGNOSIS — F41.9 ANXIETY: ICD-10-CM

## 2022-10-15 DIAGNOSIS — Z13.0 SCREENING FOR DEFICIENCY ANEMIA: ICD-10-CM

## 2022-10-15 DIAGNOSIS — R39.12 WEAK URINARY STREAM: ICD-10-CM

## 2022-10-15 LAB
ALBUMIN SERPL BCP-MCNC: 4 G/DL (ref 3.5–5)
ALP SERPL-CCNC: 62 U/L (ref 46–116)
ALT SERPL W P-5'-P-CCNC: 33 U/L (ref 12–78)
ANION GAP SERPL CALCULATED.3IONS-SCNC: 4 MMOL/L (ref 4–13)
AST SERPL W P-5'-P-CCNC: 20 U/L (ref 5–45)
BACTERIA UR QL AUTO: ABNORMAL /HPF
BASOPHILS # BLD AUTO: 0.07 THOUSANDS/ΜL (ref 0–0.1)
BASOPHILS NFR BLD AUTO: 1 % (ref 0–1)
BILIRUB SERPL-MCNC: 0.6 MG/DL (ref 0.2–1)
BILIRUB UR QL STRIP: NEGATIVE
BUN SERPL-MCNC: 22 MG/DL (ref 5–25)
CALCIUM SERPL-MCNC: 9.5 MG/DL (ref 8.3–10.1)
CHLORIDE SERPL-SCNC: 110 MMOL/L (ref 96–108)
CHOLEST SERPL-MCNC: 196 MG/DL
CLARITY UR: CLEAR
CO2 SERPL-SCNC: 25 MMOL/L (ref 21–32)
COLOR UR: ABNORMAL
CREAT SERPL-MCNC: 1.12 MG/DL (ref 0.6–1.3)
EOSINOPHIL # BLD AUTO: 0.2 THOUSAND/ΜL (ref 0–0.61)
EOSINOPHIL NFR BLD AUTO: 4 % (ref 0–6)
ERYTHROCYTE [DISTWIDTH] IN BLOOD BY AUTOMATED COUNT: 12.1 % (ref 11.6–15.1)
GFR SERPL CREATININE-BSD FRML MDRD: 78 ML/MIN/1.73SQ M
GLUCOSE P FAST SERPL-MCNC: 101 MG/DL (ref 65–99)
GLUCOSE UR STRIP-MCNC: NEGATIVE MG/DL
HCT VFR BLD AUTO: 45.5 % (ref 36.5–49.3)
HDLC SERPL-MCNC: 48 MG/DL
HGB BLD-MCNC: 14.5 G/DL (ref 12–17)
HGB UR QL STRIP.AUTO: NEGATIVE
IMM GRANULOCYTES # BLD AUTO: 0.02 THOUSAND/UL (ref 0–0.2)
IMM GRANULOCYTES NFR BLD AUTO: 0 % (ref 0–2)
KETONES UR STRIP-MCNC: NEGATIVE MG/DL
LDLC SERPL CALC-MCNC: 122 MG/DL (ref 0–100)
LEUKOCYTE ESTERASE UR QL STRIP: NEGATIVE
LYMPHOCYTES # BLD AUTO: 2.09 THOUSANDS/ΜL (ref 0.6–4.47)
LYMPHOCYTES NFR BLD AUTO: 38 % (ref 14–44)
MCH RBC QN AUTO: 29.4 PG (ref 26.8–34.3)
MCHC RBC AUTO-ENTMCNC: 31.9 G/DL (ref 31.4–37.4)
MCV RBC AUTO: 92 FL (ref 82–98)
MONOCYTES # BLD AUTO: 0.63 THOUSAND/ΜL (ref 0.17–1.22)
MONOCYTES NFR BLD AUTO: 11 % (ref 4–12)
NEUTROPHILS # BLD AUTO: 2.56 THOUSANDS/ΜL (ref 1.85–7.62)
NEUTS SEG NFR BLD AUTO: 46 % (ref 43–75)
NITRITE UR QL STRIP: NEGATIVE
NON-SQ EPI CELLS URNS QL MICRO: ABNORMAL /HPF
NRBC BLD AUTO-RTO: 0 /100 WBCS
PH UR STRIP.AUTO: 6 [PH]
PLATELET # BLD AUTO: 214 THOUSANDS/UL (ref 149–390)
PMV BLD AUTO: 10.6 FL (ref 8.9–12.7)
POTASSIUM SERPL-SCNC: 4.9 MMOL/L (ref 3.5–5.3)
PROT SERPL-MCNC: 8 G/DL (ref 6.4–8.4)
PROT UR STRIP-MCNC: ABNORMAL MG/DL
PSA SERPL-MCNC: 0.8 NG/ML (ref 0–4)
PSA SERPL-MCNC: 0.8 NG/ML (ref 0–4)
RBC # BLD AUTO: 4.94 MILLION/UL (ref 3.88–5.62)
RBC #/AREA URNS AUTO: ABNORMAL /HPF
SODIUM SERPL-SCNC: 139 MMOL/L (ref 135–147)
SP GR UR STRIP.AUTO: 1.02 (ref 1–1.03)
TRIGL SERPL-MCNC: 129 MG/DL
TSH SERPL DL<=0.05 MIU/L-ACNC: 1.99 UIU/ML (ref 0.45–4.5)
UROBILINOGEN UR STRIP-ACNC: <2 MG/DL
WBC # BLD AUTO: 5.57 THOUSAND/UL (ref 4.31–10.16)
WBC #/AREA URNS AUTO: ABNORMAL /HPF

## 2022-10-15 PROCEDURE — 36415 COLL VENOUS BLD VENIPUNCTURE: CPT

## 2022-10-15 PROCEDURE — 80053 COMPREHEN METABOLIC PANEL: CPT

## 2022-10-15 PROCEDURE — 84443 ASSAY THYROID STIM HORMONE: CPT

## 2022-10-15 PROCEDURE — 84153 ASSAY OF PSA TOTAL: CPT

## 2022-10-15 PROCEDURE — G0103 PSA SCREENING: HCPCS

## 2022-10-15 PROCEDURE — 80061 LIPID PANEL: CPT

## 2022-10-15 PROCEDURE — 81001 URINALYSIS AUTO W/SCOPE: CPT

## 2022-10-15 PROCEDURE — 85025 COMPLETE CBC W/AUTO DIFF WBC: CPT

## 2022-10-26 ENCOUNTER — RA CDI HCC (OUTPATIENT)
Dept: OTHER | Facility: HOSPITAL | Age: 47
End: 2022-10-26

## 2022-10-26 NOTE — PROGRESS NOTES
[facility]  coding [facility]       Chart reviewed, no opportunity found: CHART REVIEWED, NO OPPORTUNITY FOUND        Patients Insurance        Commercial Insurance: [address] [place] [address]

## 2022-11-01 ENCOUNTER — OFFICE VISIT (OUTPATIENT)
Dept: UROLOGY | Facility: AMBULATORY SURGERY CENTER | Age: 47
End: 2022-11-01

## 2022-11-01 VITALS
BODY MASS INDEX: 28.95 KG/M2 | HEART RATE: 82 BPM | OXYGEN SATURATION: 98 % | SYSTOLIC BLOOD PRESSURE: 120 MMHG | DIASTOLIC BLOOD PRESSURE: 86 MMHG | WEIGHT: 228 LBS

## 2022-11-01 DIAGNOSIS — R33.9 INCOMPLETE BLADDER EMPTYING: Primary | ICD-10-CM

## 2022-11-01 DIAGNOSIS — N39.43 POST-VOID DRIBBLING: ICD-10-CM

## 2022-11-01 LAB
POST-VOID RESIDUAL VOLUME, ML POC: 0 ML
SL AMB  POCT GLUCOSE, UA: NORMAL
SL AMB LEUKOCYTE ESTERASE,UA: NORMAL
SL AMB POCT BILIRUBIN,UA: NORMAL
SL AMB POCT BLOOD,UA: NORMAL
SL AMB POCT CLARITY,UA: CLEAR
SL AMB POCT COLOR,UA: NORMAL
SL AMB POCT KETONES,UA: NORMAL
SL AMB POCT NITRITE,UA: NORMAL
SL AMB POCT PH,UA: 6.5
SL AMB POCT SPECIFIC GRAVITY,UA: 1.02
SL AMB POCT URINE PROTEIN: NORMAL
SL AMB POCT UROBILINOGEN: 0.2

## 2022-11-01 NOTE — PROGRESS NOTES
11/1/2022    Trice Harding  1975  [id number]        Assessment  Incomplete emptying, postvoid dribbling    Plan  I reassured him that I do not find any abnormality on his prostate examination  If anything his prostate is relatively small for age  I assured him that his symptoms are normal with aging  He is not going to have the same flow rate as his teenage son  This is normal     Bladder scan postvoid residual today reveals empty bladder which is reassuring  I recommend continued observation but no need for follow-up at this time  History of Present Illness  Tressa Alvarez is a 41 y o  male with history of varicocele, presents today for evaluation  He is concerned that he feels his urinary stream has become weaker over the years  He also feels he does not empty his bladder completely and notices either postvoid dribbling or the need to urinate again within a few minutes of completing his urination  He does not have dysuria, hematuria  He denies constipation  He says he hydrates well with water  No family history of prostate problems or prostate cancer  No further complaints regarding his varicocele  PSA was 0 5        AUA SYMPTOM SCORE    Flowsheet Row Most Recent Value   AUA SYMPTOM SCORE    How often have you had a sensation of not emptying your bladder completely after you finished urinating? 3 (P)     How often have you had to urinate again less than two hours after you finished urinating? 3 (P)     How often have you found you stopped and started again several times when you urinate? 4 (P)     How often have you found it difficult to postpone urination? 0 (P)     How often have you had a weak urinary stream? 5 (P)     How often have you had to push or strain to begin urination? 1 (P)     How many times did you most typically get up to urinate from the time you went to bed at night until the time you got up in the morning? 0 (P)     Quality of Life: If you were to spend the rest of your life with your urinary condition just the way it is now, how would you feel about that? 4 (P)     AUA SYMPTOM SCORE 16 (P)           Review of Systems  Review of Systems   Constitutional: Negative  HENT: Negative  Respiratory: Negative  Cardiovascular: Negative  Gastrointestinal: Negative  Genitourinary:        As per HPI   Musculoskeletal: Negative  Skin: Negative  Neurological: Negative  Hematological: Negative  Past Medical History  Past Medical History:   Diagnosis Date   • Hypertension 3/23/2015       Past Social History  Past Surgical History:   Procedure Laterality Date   • APPENDECTOMY     • WISDOM TOOTH EXTRACTION         Past Family History  Family History   Problem Relation Age of Onset   • Hypertension Mother    • Anxiety disorder Mother    • Irregular heart beat Mother         pacemaker   • Colon polyps Mother    • Diabetes Maternal Grandfather    • No Known Problems Father        Past Social history  Social History     Socioeconomic History   • Marital status: /Civil Union     Spouse name: Not on file   • Number of children: Not on file   • Years of education: Not on file   • Highest education level: Not on file   Occupational History   • Not on file   Tobacco Use   • Smoking status: Current Some Day Smoker     Types: Cigars   • Smokeless tobacco: Former User     Quit date: 2021   • Tobacco comment: occasional cigar   Vaping Use   • Vaping Use: Some days   • Substances: THC   Substance and Sexual Activity   • Alcohol use: Yes     Comment: 25 drinks per week - IPA beer and whisky   • Drug use: Yes     Types:  Other     Comment: Vapes THC   • Sexual activity: Not Currently     Partners: Female   Other Topics Concern   • Not on file   Social History Narrative   • Not on file     Social Determinants of Health     Financial Resource Strain: Not on file   Food Insecurity: Not on file   Transportation Needs: Not on file   Physical Activity: Not on file   Stress: Not on file Social Connections: Not on file   Intimate Partner Violence: Not on file   Housing Stability: Not on file     Social History     Tobacco Use   Smoking Status Current Some Day Smoker   • Types: Cigars   Smokeless Tobacco Former User   • Quit date: 2021   Tobacco Comment    occasional cigar       Current Medications  Current Outpatient Medications   Medication Sig Dispense Refill   • amLODIPine (NORVASC) 5 mg tablet TAKE 1 TABLET DAILY 90 tablet 3   • ketoconazole (NIZORAL) 2 % shampoo Apply 1 application topically once for 1 dose 120 mL 1   • valACYclovir (VALTREX) 1,000 mg tablet Take 2 tablets twice a day for 1 day at first sign of outbreak 30 tablet 0     No current facility-administered medications for this visit  Allergies  Allergies   Allergen Reactions   • Amoxicillin Drowsiness   • Penicillins        Past Medical History, Social History, Family History, medications and allergies were reviewed  Vitals  Vitals:    11/01/22 1522   BP: 120/86   BP Location: Right arm   Patient Position: Sitting   Cuff Size: Large   Pulse: 82   SpO2: 98%   Weight: 103 kg (228 lb)       Physical Exam  Physical Exam  Vitals reviewed  Constitutional:       Appearance: He is well-developed  HENT:      Head: Normocephalic and atraumatic  Eyes:      Conjunctiva/sclera: Conjunctivae normal    Cardiovascular:      Rate and Rhythm: Normal rate  Pulmonary:      Effort: Pulmonary effort is normal    Abdominal:      Palpations: Abdomen is soft  Genitourinary:     Comments: Prostate small, about 25 g, smooth, no palpable nodules  Musculoskeletal:         General: Normal range of motion  Skin:     General: Skin is warm and dry  Neurological:      Mental Status: He is alert and oriented to person, place, and time     Psychiatric:         Mood and Affect: Mood normal            Results  Lab Results   Component Value Date    PSA 0 8 10/15/2022    PSA 0 8 10/15/2022    PSA 0 8 08/18/2021     Lab Results   Component Value Date    GLUCOSE 100 03/31/2015    CALCIUM 9 5 10/15/2022     03/31/2015    K 4 9 10/15/2022    CO2 25 10/15/2022     (H) 10/15/2022    BUN 22 10/15/2022    CREATININE 1 12 10/15/2022     Lab Results   Component Value Date    WBC 5 57 10/15/2022    HGB 14 5 10/15/2022    HCT 45 5 10/15/2022    MCV 92 10/15/2022     10/15/2022

## 2022-11-22 DIAGNOSIS — I10 ESSENTIAL HYPERTENSION: ICD-10-CM

## 2022-11-22 RX ORDER — AMLODIPINE BESYLATE 5 MG/1
TABLET ORAL
Qty: 90 TABLET | Refills: 3 | Status: SHIPPED | OUTPATIENT
Start: 2022-11-22

## 2022-11-23 ENCOUNTER — OFFICE VISIT (OUTPATIENT)
Dept: AUDIOLOGY | Age: 47
End: 2022-11-23

## 2022-11-23 DIAGNOSIS — H90.3 SENSORY HEARING LOSS, BILATERAL: Primary | ICD-10-CM

## 2022-11-23 NOTE — PROGRESS NOTES
HEARING EVALUATION    Name:  Alfredo Carrasquillo  :  1975  Age:  41 y o  Date of Evaluation: 22     History: Difficulty Understanding  Reason for visit: Alfredo Carrasquillo is being seen today at the request of Dr Shireen Jewell for an evaluation of hearing  Patient reports difficulty understanding for several years  Patient denies otalgia, otorrhea, dizziness, fullness, and tinnitus  Patient denies a family history of hearing loss and noise exposure  EVALUATION:    Otoscopic Evaluation:   Right Ear: Clear and healthy ear canal and tympanic membrane   Left Ear: Clear and healthy ear canal and tympanic membrane    Tympanometry:   Right: Type A - normal middle ear pressure and compliance   Left: Type A - normal middle ear pressure and compliance    Audiogram Results:  Pure tone testing revealed a normal sloping to moderate rising to mild sensorineural hearing loss in the  right ear and a normal sloping to moderately severe sensorineural hearing loss in the  left  ear  SRT and PTA are in agreement indicating good test reliability  Word recognition scores were excellent bilaterally  *see attached audiogram      RECOMMENDATIONS:  Annual hearing eval, Consult ENT, Return to [facility]  for F/U, Hearing Aid Evaluation and Copy to Patient/Caregiver    PATIENT EDUCATION:   Discussed results and recommendations with Carmell Leventhal  Questions were addressed and the patient was encouraged to contact our department should concerns arise  Sundar Stevenson    Clinical Audiologist

## 2022-12-15 ENCOUNTER — OFFICE VISIT (OUTPATIENT)
Dept: INTERNAL MEDICINE CLINIC | Facility: OTHER | Age: 47
End: 2022-12-15

## 2022-12-15 VITALS
SYSTOLIC BLOOD PRESSURE: 122 MMHG | WEIGHT: 234 LBS | DIASTOLIC BLOOD PRESSURE: 82 MMHG | HEIGHT: 74 IN | OXYGEN SATURATION: 98 % | TEMPERATURE: 99.1 F | BODY MASS INDEX: 30.03 KG/M2 | HEART RATE: 67 BPM

## 2022-12-15 DIAGNOSIS — I10 PRIMARY HYPERTENSION: ICD-10-CM

## 2022-12-15 DIAGNOSIS — F41.9 ANXIETY: Primary | ICD-10-CM

## 2022-12-15 DIAGNOSIS — K04.7 DENTAL INFECTION: ICD-10-CM

## 2022-12-15 PROBLEM — K14.6 TONGUE SORE: Status: RESOLVED | Noted: 2022-06-28 | Resolved: 2022-12-15

## 2022-12-15 PROBLEM — B36.0 TINEA VERSICOLOR: Status: RESOLVED | Noted: 2018-08-14 | Resolved: 2022-12-15

## 2022-12-15 RX ORDER — ESCITALOPRAM OXALATE 5 MG/1
5 TABLET ORAL DAILY
Qty: 30 TABLET | Refills: 1 | Status: SHIPPED | OUTPATIENT
Start: 2022-12-15

## 2022-12-15 NOTE — PROGRESS NOTES
Assessment/Plan:    Problem List Items Addressed This Visit        Digestive    Dental infection     - currently on abx prescribed by his dentist (possibly clindamycin but pt unsure)  - symptoms improving  - recommend following up with dentist in office if symptoms do not resolve            Cardiovascular and Mediastinum    Hypertension     - controlled on amlodipine 5mg daily             Other    Anxiety - Primary     - start lexapro 5mg daily  - follow up 1 month         Relevant Medications    escitalopram (Lexapro) 5 mg tablet       M*Modal software was used to dictate this note  It may contain errors with dictating incorrect words or incorrect spelling  Please contact the provider directly with any questions  Subjective:      Patient ID: Eric Lawler is a 41 y o  male  HPI     Patient presents today for routine follow up  Labs completed 10/15/22  CMP showed elevated   Total cholesterol 196, trigs 129, HDL 48,   ASCVD risk 5 9%  CBC normal  PSA normal  TSH normal     He reports having a right upper tooth infection  Symptoms started about a week ago  He was having pain with chewing on the right side, sensitivity and pain of right upper tooth  He called his dentist and was started on antibiotics  He is noticing improvement in his symptoms     He thinks he wants to try another anxiety medication  He has a lot of frustration  He has 2 sons in 9th and 8th grade and he works as a 9-[employer]   He gets stressed dealing with his children and trying to be motivating and encouraging to them  The following portions of the patient's history were reviewed and updated as appropriate: allergies, current medications, past family history, past medical history, past social history, past surgical history and problem list     Review of Systems   Constitutional: Negative for fever  HENT: Positive for dental problem  Respiratory: Negative for chest tightness and shortness of breath  Cardiovascular: Negative for chest pain  Psychiatric/Behavioral: Negative for dysphoric mood  The patient is nervous/anxious  Past Medical History:   Diagnosis Date   • Hypertension 3/23/2015         Current Outpatient Medications:   •  amLODIPine (NORVASC) 5 mg tablet, TAKE 1 TABLET DAILY, Disp: 90 tablet, Rfl: 3  •  escitalopram (Lexapro) 5 mg tablet, Take 1 tablet (5 mg total) by mouth daily, Disp: 30 tablet, Rfl: 1  •  valACYclovir (VALTREX) 1,000 mg tablet, Take 2 tablets twice a day for 1 day at first sign of outbreak, Disp: 30 tablet, Rfl: 0  •  ketoconazole (NIZORAL) 2 % shampoo, Apply 1 application topically once for 1 dose, Disp: 120 mL, Rfl: 1    Allergies   Allergen Reactions   • Amoxicillin Drowsiness   • Penicillins        Social History   Past Surgical History:   Procedure Laterality Date   • APPENDECTOMY     • WISDOM TOOTH EXTRACTION       Family History   Problem Relation Age of Onset   • Hypertension Mother    • Anxiety disorder Mother    • Irregular heart beat Mother         pacemaker   • Colon polyps Mother    • Diabetes Maternal Grandfather    • No Known Problems Father        Objective:  /82 (BP Location: Left arm, Patient Position: Sitting, Cuff Size: Large)   Pulse 67   Temp 99 1 °F (37 3 °C) (Temporal)   Ht 6' 2" (1 88 m)   Wt 106 kg (234 lb)   SpO2 98%   BMI 30 04 kg/m²      Physical Exam  Vitals reviewed  Constitutional:       General: He is not in acute distress  Appearance: Normal appearance  He is not diaphoretic  HENT:      Head: Normocephalic and atraumatic  Mouth/Throat:      Mouth: Mucous membranes are moist       Dentition: Normal dentition  No dental tenderness  Eyes:      Extraocular Movements: Extraocular movements intact  Conjunctiva/sclera: Conjunctivae normal       Pupils: Pupils are equal, round, and reactive to light  Cardiovascular:      Rate and Rhythm: Normal rate and regular rhythm  Heart sounds: Normal heart sounds  No murmur heard  Pulmonary:      Effort: Pulmonary effort is normal  No respiratory distress  Breath sounds: Normal breath sounds  No wheezing, rhonchi or rales  Skin:     General: Skin is warm and dry  Neurological:      Mental Status: He is alert and oriented to person, place, and time  Mental status is at baseline     Psychiatric:         Mood and Affect: Mood normal          Behavior: Behavior normal

## 2022-12-15 NOTE — ASSESSMENT & PLAN NOTE
- currently on abx prescribed by his dentist (possibly clindamycin but pt unsure)  - symptoms improving  - recommend following up with dentist in office if symptoms do not resolve

## 2023-01-19 ENCOUNTER — RA CDI HCC (OUTPATIENT)
Dept: OTHER | Facility: HOSPITAL | Age: 48
End: 2023-01-19

## 2023-01-19 NOTE — PROGRESS NOTES
[facility]  coding opportunities       Chart reviewed, no opportunity found: CHART REVIEWED, NO OPPORTUNITY FOUND      This is a reminder to address ALL HCC (risk adjustment) codes as found on active problem list for 2023 as patient scores reset to zero KINA      Patients Insurance        Commercial Insurance: [address] [place] [address]

## 2023-01-26 ENCOUNTER — OFFICE VISIT (OUTPATIENT)
Dept: INTERNAL MEDICINE CLINIC | Facility: OTHER | Age: 48
End: 2023-01-26

## 2023-01-26 VITALS
SYSTOLIC BLOOD PRESSURE: 122 MMHG | TEMPERATURE: 97.3 F | HEART RATE: 60 BPM | HEIGHT: 74 IN | OXYGEN SATURATION: 98 % | BODY MASS INDEX: 30.03 KG/M2 | WEIGHT: 234 LBS | DIASTOLIC BLOOD PRESSURE: 72 MMHG

## 2023-01-26 DIAGNOSIS — F41.9 ANXIETY: Primary | ICD-10-CM

## 2023-01-26 RX ORDER — ESCITALOPRAM OXALATE 5 MG/1
5 TABLET ORAL DAILY
Qty: 90 TABLET | Refills: 1 | Status: SHIPPED | OUTPATIENT
Start: 2023-01-26

## 2023-01-26 NOTE — PROGRESS NOTES
Assessment/Plan:    Problem List Items Addressed This Visit        Other    Anxiety - Primary     - continue lexapro 5mg daily  - follow up 6 months          Relevant Medications    escitalopram (Lexapro) 5 mg tablet     M*Modal software was used to dictate this note  It may contain errors with dictating incorrect words or incorrect spelling  Please contact the provider directly with any questions  Subjective:      Patient ID: Ericka Coronado is a 41 y o  male  HPI     Patient presents today for 1 month follow up anxiety after starting lexapro 5 weeks ago  He is feeling better on the medication  He feels things at home and in life in general have also been better so it may be multifactorial  He tells me his wife has also noticed an improvement in his mood  He denies any adverse side effects  He is taking the medication at bedtime     The following portions of the patient's history were reviewed and updated as appropriate: allergies, current medications, past family history, past medical history, past social history, past surgical history and problem list     Review of Systems   Psychiatric/Behavioral: Negative for dysphoric mood  The patient is nervous/anxious            Past Medical History:   Diagnosis Date   • Hypertension 3/23/2015         Current Outpatient Medications:   •  amLODIPine (NORVASC) 5 mg tablet, TAKE 1 TABLET DAILY, Disp: 90 tablet, Rfl: 3  •  escitalopram (Lexapro) 5 mg tablet, Take 1 tablet (5 mg total) by mouth daily, Disp: 90 tablet, Rfl: 1  •  valACYclovir (VALTREX) 1,000 mg tablet, Take 2 tablets twice a day for 1 day at first sign of outbreak, Disp: 30 tablet, Rfl: 0    Allergies   Allergen Reactions   • Amoxicillin Drowsiness   • Penicillins        Social History   Past Surgical History:   Procedure Laterality Date   • APPENDECTOMY     • WISDOM TOOTH EXTRACTION       Family History   Problem Relation Age of Onset   • Hypertension Mother    • Anxiety disorder Mother    • Irregular heart beat Mother         pacemaker   • Colon polyps Mother    • Diabetes Maternal Grandfather    • No Known Problems Father        Objective:  /72 (BP Location: Left arm, Patient Position: Sitting, Cuff Size: Large)   Pulse 60   Temp (!) 97 3 °F (36 3 °C) (Temporal)   Ht 6' 2" (1 88 m)   Wt 106 kg (234 lb)   SpO2 98%   BMI 30 04 kg/m²      Physical Exam  Vitals reviewed  Constitutional:       General: He is not in acute distress  Appearance: Normal appearance  He is not diaphoretic  HENT:      Head: Normocephalic and atraumatic  Eyes:      Extraocular Movements: Extraocular movements intact  Conjunctiva/sclera: Conjunctivae normal       Pupils: Pupils are equal, round, and reactive to light  Cardiovascular:      Rate and Rhythm: Normal rate and regular rhythm  Heart sounds: Normal heart sounds  No murmur heard  Pulmonary:      Effort: Pulmonary effort is normal  No respiratory distress  Breath sounds: Normal breath sounds  No wheezing, rhonchi or rales  Neurological:      Mental Status: He is alert and oriented to person, place, and time  Mental status is at baseline  Psychiatric:         Mood and Affect: Mood normal          Behavior: Behavior normal          Thought Content:  Thought content normal          Judgment: Judgment normal

## 2023-02-03 ENCOUNTER — TELEPHONE (OUTPATIENT)
Dept: INTERNAL MEDICINE CLINIC | Facility: OTHER | Age: 48
End: 2023-02-03

## 2023-02-03 NOTE — TELEPHONE ENCOUNTER
He can use mucinex, flonase, Neti pot, warm steam    He should avoid decongestants due to his high blood pressure

## 2023-02-03 NOTE — TELEPHONE ENCOUNTER
Patient is calling to see if there is any recommendations you have for over the counter medication that will help with sinus pressure and congestion?

## 2023-02-07 ENCOUNTER — TELEMEDICINE (OUTPATIENT)
Dept: INTERNAL MEDICINE CLINIC | Facility: OTHER | Age: 48
End: 2023-02-07

## 2023-02-07 DIAGNOSIS — J01.00 ACUTE NON-RECURRENT MAXILLARY SINUSITIS: Primary | ICD-10-CM

## 2023-02-07 RX ORDER — CEFDINIR 300 MG/1
300 CAPSULE ORAL EVERY 12 HOURS SCHEDULED
Qty: 14 CAPSULE | Refills: 0 | Status: SHIPPED | OUTPATIENT
Start: 2023-02-07 | End: 2023-02-14

## 2023-02-07 NOTE — PROGRESS NOTES
Virtual Regular Visit    Verification of patient location:    Patient is located in the following state in which I hold an active license [place]      Assessment/Plan:    Problem List Items Addressed This Visit        Respiratory    Acute non-recurrent maxillary sinusitis - Primary     Start cefdinir  Rest and fluids advised  Educated that the course of this illness could be 2-4 weeks  Discussed symptomatic relief, such as warm steam inhalations, tylenol/ibuprofen for fevers and body aches, rest, and drink plenty of fluids  Warm salt gargles for sore throat  Discussed red flag signs to go to the ER, such as chest pain or shortness of breath  Return to the office for reevaluation if symptoms worsen or do not improve in 1-2 weeks            Relevant Medications    cefdinir (OMNICEF) 300 mg capsule            Reason for visit is   Chief Complaint   Patient presents with   • Earache     Persistent sinus infection since last Monday  Has a headache and sinus issues on right side only  States spoke to [facility] and suggested Flonase and Mucinex but just not helping  Took covid test last week and yesterday was negative   Ear pain is very bothersome  No earache, slight cough  Encounter provider PRIMO Brennan    Provider located at [address] [place] [id number]      Recent Visits  Date Type Provider Dept   02/03/23 Telephone Loree Araujo [facility] - [facility]   Showing recent visits within past 7 days and meeting all other requirements  Today's Visits  Date Type Provider Dept   02/07/23 [facility] PRIMO Hawthorne [facility] - [facility]   Showing today's visits and meeting all other requirements  Future Appointments  No visits were found meeting these conditions    Showing future appointments within next 150 days and meeting all other requirements       The patient was identified by name and date of birth  Jazmine Alonso was informed that this is a telemedicine visit and that the visit is being conducted through the [address] [employer] platform  He agrees to proceed     My office door was closed  No one else was in the room  He acknowledged consent and understanding of privacy and security of the video platform  The patient has agreed to participate and understands they can discontinue the visit at any time  Patient is aware this is a billable service  Subjective  Jazmine Alonso is a 41 y o  male   Patient calls in today with ongoing congestion  Took covid test last week and yesterday was negative   Sinusitis  This is a new problem  The current episode started in the past 7 days  The problem is unchanged  There has been no fever  Associated symptoms include congestion, coughing (mild), ear pain, headaches and sinus pressure (right sided)  Pertinent negatives include no chills, diaphoresis, neck pain, shortness of breath or sore throat  Treatments tried: flonase and Mucinex  The treatment provided mild relief  Past Medical History:   Diagnosis Date   • Hypertension 3/23/2015       Past Surgical History:   Procedure Laterality Date   • APPENDECTOMY     • WISDOM TOOTH EXTRACTION         Current Outpatient Medications   Medication Sig Dispense Refill   • amLODIPine (NORVASC) 5 mg tablet TAKE 1 TABLET DAILY 90 tablet 3   • cefdinir (OMNICEF) 300 mg capsule Take 1 capsule (300 mg total) by mouth every 12 (twelve) hours for 7 days 14 capsule 0   • escitalopram (Lexapro) 5 mg tablet Take 1 tablet (5 mg total) by mouth daily 90 tablet 1   • valACYclovir (VALTREX) 1,000 mg tablet Take 2 tablets twice a day for 1 day at first sign of outbreak 30 tablet 0     No current facility-administered medications for this visit          Allergies   Allergen Reactions   • Amoxicillin Drowsiness   • Penicillins        Review of Systems   Constitutional: Negative for activity change, appetite change, chills, diaphoresis and fever  HENT: Positive for congestion, ear pain and sinus pressure (right sided)  Negative for ear discharge, postnasal drip, rhinorrhea, sinus pain and sore throat  Eyes: Negative for pain, discharge, itching and visual disturbance  Respiratory: Positive for cough (mild)  Negative for chest tightness, shortness of breath and wheezing  Cardiovascular: Negative for chest pain, palpitations and leg swelling  Gastrointestinal: Negative for abdominal pain, constipation, diarrhea, nausea and vomiting  Endocrine: Negative for polydipsia, polyphagia and polyuria  Genitourinary: Negative for difficulty urinating, dysuria and urgency  Musculoskeletal: Negative for arthralgias, back pain and neck pain  Skin: Negative for rash and wound  Neurological: Positive for headaches  Negative for dizziness, weakness and numbness  Video Exam    There were no vitals filed for this visit  Physical Exam  Neurological:      Mental Status: He is alert and oriented to person, place, and time            I spent 15 minutes directly with the patient during this visit

## 2023-02-07 NOTE — ASSESSMENT & PLAN NOTE
Start cefdinir  Rest and fluids advised  Educated that the course of this illness could be 2-4 weeks  Discussed symptomatic relief, such as warm steam inhalations, tylenol/ibuprofen for fevers and body aches, rest, and drink plenty of fluids  Warm salt gargles for sore throat  Discussed red flag signs to go to the ER, such as chest pain or shortness of breath     Return to the office for reevaluation if symptoms worsen or do not improve in 1-2 weeks

## 2023-02-24 ENCOUNTER — RA CDI HCC (OUTPATIENT)
Dept: OTHER | Facility: HOSPITAL | Age: 48
End: 2023-02-24

## 2023-02-24 NOTE — PROGRESS NOTES
[facility]  coding opportunities       Chart reviewed, no opportunity found: CHART REVIEWED, NO OPPORTUNITY FOUND      This is a reminder to address ALL HCC (risk adjustment) codes as found on active problem list for 2023 as patient scores reset to zero KINA      Patients Insurance        Commercial Insurance: [address] [place] [address]

## 2023-03-03 ENCOUNTER — OFFICE VISIT (OUTPATIENT)
Dept: INTERNAL MEDICINE CLINIC | Facility: OTHER | Age: 48
End: 2023-03-03

## 2023-03-03 VITALS
HEART RATE: 65 BPM | RESPIRATION RATE: 20 BRPM | DIASTOLIC BLOOD PRESSURE: 74 MMHG | HEIGHT: 74 IN | OXYGEN SATURATION: 97 % | TEMPERATURE: 98.3 F | BODY MASS INDEX: 30.29 KG/M2 | WEIGHT: 236 LBS | SYSTOLIC BLOOD PRESSURE: 118 MMHG

## 2023-03-03 DIAGNOSIS — R25.3 EYELID TWITCH: ICD-10-CM

## 2023-03-03 DIAGNOSIS — R53.83 FATIGUE, UNSPECIFIED TYPE: Primary | ICD-10-CM

## 2023-03-03 PROBLEM — J01.00 ACUTE NON-RECURRENT MAXILLARY SINUSITIS: Status: RESOLVED | Noted: 2023-02-07 | Resolved: 2023-03-03

## 2023-03-03 RX ORDER — SODIUM FLUORIDE 6 MG/ML
PASTE, DENTIFRICE DENTAL
COMMUNITY
Start: 2023-02-07

## 2023-03-03 RX ORDER — CLINDAMYCIN HYDROCHLORIDE 150 MG/1
150 CAPSULE ORAL AS NEEDED
COMMUNITY
Start: 2022-12-18 | End: 2023-03-03

## 2023-03-03 NOTE — PROGRESS NOTES
Assessment/Plan:    Problem List Items Addressed This Visit        Other    Fatigue - Primary     - check CBC, bmp, tsh and lyme           Relevant Orders    Lyme Antibody Profile with reflex to WB    CBC and differential    Basic metabolic panel    TSH, 3rd generation with Free T4 reflex   Other Visit Diagnoses     Eyelid twitch        improving  started after cefdinir use  neuro exam normal  check TSH and BMP    Relevant Orders    Basic metabolic panel    TSH, 3rd generation with Free T4 reflex        Advised to follow up if symptoms worsen or do not improve     M*Modal software was used to dictate this note  It may contain errors with dictating incorrect words or incorrect spelling  Please contact the provider directly with any questions  Subjective:      Patient ID: Hector Caldera is a 41 y o  male  HPI     Patient presents today with concern for persistent left eyelid twitching  He also noticed that his eyes were watering more, he was not able to purse his lips in the same way to whistle or spit  He is unsure if his chewing was any different  He noticed this after being on cefdinir after for his sinusitis  No numbness or tingling  He has been tired lately, he has been napping more often after schol  He has had ticks on him recently     The following portions of the patient's history were reviewed and updated as appropriate: allergies, current medications, past family history, past medical history, past social history, past surgical history and problem list     Review of Systems   Eyes: Positive for discharge (watery)  Twitching left eye   Neurological: Negative for dizziness, weakness, light-headedness and headaches           Past Medical History:   Diagnosis Date   • Hypertension 3/23/2015         Current Outpatient Medications:   •  amLODIPine (NORVASC) 5 mg tablet, TAKE 1 TABLET DAILY, Disp: 90 tablet, Rfl: 3  •  escitalopram (Lexapro) 5 mg tablet, Take 1 tablet (5 mg total) by mouth daily, Disp: 90 tablet, Rfl: 1  •  Sodium Fluoride 5000 PPM 1 1 % PSTE, BRUSH TWICE DAILY IN MORNING AND EVENING  DO NOT RINSE, Disp: , Rfl:   •  valACYclovir (VALTREX) 1,000 mg tablet, Take 2 tablets twice a day for 1 day at first sign of outbreak, Disp: 30 tablet, Rfl: 0    Allergies   Allergen Reactions   • Amoxicillin Drowsiness   • Cefdinir Other (See Comments)     Eyelid twitching      • Penicillins      Social History   Past Surgical History:   Procedure Laterality Date   • APPENDECTOMY     • WISDOM TOOTH EXTRACTION       Family History   Problem Relation Age of Onset   • Hypertension Mother    • Anxiety disorder Mother    • Irregular heart beat Mother         pacemaker   • Colon polyps Mother    • Diabetes Maternal Grandfather    • No Known Problems Father        Objective:  /74 (BP Location: Left arm, Patient Position: Sitting, Cuff Size: Large)   Pulse 65   Temp 98 3 °F (36 8 °C) (Temporal)   Resp 20   Ht 6' 2" (1 88 m)   Wt 107 kg (236 lb)   SpO2 97%   BMI 30 30 kg/m²      Physical Exam  Vitals reviewed  Constitutional:       General: He is not in acute distress  Appearance: Normal appearance  He is not diaphoretic  HENT:      Head: Normocephalic and atraumatic  Right Ear: Tympanic membrane and external ear normal       Left Ear: Tympanic membrane and external ear normal    Eyes:      Extraocular Movements: Extraocular movements intact  Conjunctiva/sclera: Conjunctivae normal       Pupils: Pupils are equal, round, and reactive to light  Comments: No eyelid twitching during exam    Cardiovascular:      Rate and Rhythm: Normal rate and regular rhythm  Heart sounds: Normal heart sounds  No murmur heard  Pulmonary:      Effort: Pulmonary effort is normal  No respiratory distress  Breath sounds: Normal breath sounds  No wheezing, rhonchi or rales  Skin:     General: Skin is warm and dry  Neurological:      General: No focal deficit present        Mental Status: He is alert and oriented to person, place, and time  Mental status is at baseline  Cranial Nerves: No cranial nerve deficit  Motor: Motor function is intact  No weakness  Coordination: Coordination is intact  Romberg sign negative  Coordination normal  Finger-Nose-Finger Test and Heel to [facility] Test normal  Rapid alternating movements normal       Gait: Gait is intact  Gait and tandem walk normal    Psychiatric:         Mood and Affect: Mood normal          Behavior: Behavior normal          Thought Content:  Thought content normal          Judgment: Judgment normal

## 2023-05-17 ENCOUNTER — TELEPHONE (OUTPATIENT)
Dept: INTERNAL MEDICINE CLINIC | Facility: OTHER | Age: 48
End: 2023-05-17

## 2023-05-17 NOTE — TELEPHONE ENCOUNTER
He should also be using flonase 2 sprays each nostril daily, have him look down and spray up  Continue allegra needs to be daily use   Both should be taken consistently

## 2023-05-17 NOTE — TELEPHONE ENCOUNTER
Patient called with worsening allergy symptoms  Stated she was taking OTC Allegra and seemed to be helping, today he stated his sinuses are congested and the OTC Allegra is not helping his sinus symptoms  Patient would like to know if there is a better alternative to help with allergy symptoms       Please advise thank you

## 2023-05-17 NOTE — TELEPHONE ENCOUNTER
Spoke with patient, will continue Allegra and add flonase  Will call to make an appointment if symptoms fail to improve

## 2023-07-20 ENCOUNTER — RA CDI HCC (OUTPATIENT)
Dept: OTHER | Facility: HOSPITAL | Age: 48
End: 2023-07-20

## 2023-07-20 NOTE — PROGRESS NOTES
[address] [facility] coding opportunities       Chart reviewed, no opportunity found: CHART REVIEWED, NO OPPORTUNITY FOUND        Patients Insurance        Commercial Insurance: [employer]

## 2023-08-19 DIAGNOSIS — F41.9 ANXIETY: ICD-10-CM

## 2023-08-21 RX ORDER — ESCITALOPRAM OXALATE 5 MG/1
5 TABLET ORAL DAILY
Qty: 30 TABLET | Refills: 5 | OUTPATIENT
Start: 2023-08-21

## 2023-08-22 DIAGNOSIS — F41.9 ANXIETY: ICD-10-CM

## 2023-08-22 RX ORDER — ESCITALOPRAM OXALATE 5 MG/1
5 TABLET ORAL DAILY
Qty: 90 TABLET | Refills: 1 | Status: SHIPPED | OUTPATIENT
Start: 2023-08-22

## 2023-08-22 RX ORDER — ESCITALOPRAM OXALATE 5 MG/1
5 TABLET ORAL DAILY
Qty: 30 TABLET | Refills: 5 | OUTPATIENT
Start: 2023-08-22

## 2023-11-08 DIAGNOSIS — B00.1 HERPES LABIALIS: ICD-10-CM

## 2023-11-08 RX ORDER — VALACYCLOVIR HYDROCHLORIDE 1 G/1
TABLET, FILM COATED ORAL
Qty: 30 TABLET | Refills: 5 | Status: SHIPPED | OUTPATIENT
Start: 2023-11-08 | End: 2024-07-13

## 2023-11-08 NOTE — TELEPHONE ENCOUNTER
Last office visit 3/3  Next Office Visit  not scheduled left message on machine for patient to call office to make follow up appointment.  Spoke with patient Next Office Visit 12/27

## 2023-12-18 ENCOUNTER — RA CDI HCC (OUTPATIENT)
Dept: OTHER | Facility: HOSPITAL | Age: 48
End: 2023-12-18

## 2023-12-18 NOTE — PROGRESS NOTES
HCC coding opportunities       Chart reviewed, no opportunity found: CHART REVIEWED, NO OPPORTUNITY FOUND        Patients Insurance        Commercial Insurance: [employer] Commercial [employer]

## 2023-12-27 ENCOUNTER — OFFICE VISIT (OUTPATIENT)
Dept: INTERNAL MEDICINE CLINIC | Facility: OTHER | Age: 48
End: 2023-12-27
Payer: COMMERCIAL

## 2023-12-27 VITALS
HEIGHT: 74 IN | TEMPERATURE: 97.9 F | OXYGEN SATURATION: 99 % | WEIGHT: 254.4 LBS | DIASTOLIC BLOOD PRESSURE: 82 MMHG | SYSTOLIC BLOOD PRESSURE: 124 MMHG | HEART RATE: 65 BPM | BODY MASS INDEX: 32.65 KG/M2

## 2023-12-27 DIAGNOSIS — I10 PRIMARY HYPERTENSION: Primary | ICD-10-CM

## 2023-12-27 DIAGNOSIS — F41.9 ANXIETY: ICD-10-CM

## 2023-12-27 DIAGNOSIS — Z23 ENCOUNTER FOR IMMUNIZATION: ICD-10-CM

## 2023-12-27 DIAGNOSIS — E78.00 ELEVATED LDL CHOLESTEROL LEVEL: ICD-10-CM

## 2023-12-27 PROBLEM — K04.7 DENTAL INFECTION: Status: RESOLVED | Noted: 2022-12-15 | Resolved: 2023-12-27

## 2023-12-27 PROBLEM — M77.8 FOREARM TENDONITIS: Status: RESOLVED | Noted: 2021-08-17 | Resolved: 2023-12-27

## 2023-12-27 PROBLEM — R53.83 FATIGUE: Status: RESOLVED | Noted: 2023-03-03 | Resolved: 2023-12-27

## 2023-12-27 PROCEDURE — 99213 OFFICE O/P EST LOW 20 MIN: CPT | Performed by: NURSE PRACTITIONER

## 2023-12-27 PROCEDURE — 90471 IMMUNIZATION ADMIN: CPT

## 2023-12-27 PROCEDURE — 90686 IIV4 VACC NO PRSV 0.5 ML IM: CPT

## 2023-12-27 RX ORDER — ESCITALOPRAM OXALATE 5 MG/1
5 TABLET ORAL DAILY
Qty: 90 TABLET | Refills: 1 | Status: SHIPPED | OUTPATIENT
Start: 2023-12-27

## 2023-12-27 NOTE — PROGRESS NOTES
Assessment/Plan:    Problem List Items Addressed This Visit       Hypertension - Primary     - controlled on amlodipine 5mg daily          Anxiety     - controlled on lexapro 5mg daily          Relevant Medications    escitalopram (Lexapro) 5 mg tablet     Other Visit Diagnoses       Encounter for immunization        Relevant Orders    influenza vaccine, quadrivalent, 0.5 mL, preservative-free, for adult and pediatric patients 6 mos+ (AFLURIA, FLUARIX, FLULAVAL, FLUZONE) (Completed)    Elevated LDL cholesterol level        Relevant Orders    Lipid Panel with Direct LDL reflex            Depression Screening and Follow-up Plan: Patient was screened for depression during today's encounter. They screened negative with a PHQ-2 score of 0.        [employer] software was used to dictate this note.  It may contain errors with dictating incorrect words or incorrect spelling. Please contact the provider directly with any questions.    Subjective:      Patient ID: Nino Moreira is a 48 y.o. male.    HPI    Patient presents today for 6 month follow up  He did not have labs completed before his visit today     HTN- BP is well controlled on amlodipine 5mg daily. He is exercising daily. He is compliant with his medication.     Anxiety - controlled on lexapro 5mg daily. Eventually he would like to come off of this medication but he does still find benefit in taking it.     The following portions of the patient's history were reviewed and updated as appropriate: allergies, current medications, past family history, past medical history, past social history, past surgical history, and problem list.    Review of Systems   Constitutional:  Negative for activity change, appetite change, chills and fever.   Respiratory:  Negative for chest tightness and shortness of breath.    Cardiovascular:  Positive for chest pain (intermittent right sided chest pain, feels muscular per patient, soreness).   Neurological:  Negative for dizziness.      "    Past Medical History:   Diagnosis Date    Hypertension 3/23/2015         Current Outpatient Medications:     amLODIPine (NORVASC) 5 mg tablet, TAKE 1 TABLET DAILY, Disp: 90 tablet, Rfl: 0    escitalopram (Lexapro) 5 mg tablet, Take 1 tablet (5 mg total) by mouth daily, Disp: 90 tablet, Rfl: 1    Sodium Fluoride 5000 PPM 1.1 % PSTE, BRUSH TWICE DAILY IN MORNING AND EVENING. DO NOT RINSE, Disp: , Rfl:     valACYclovir (VALTREX) 1,000 mg tablet, Take 2 tablets twice a day for 1 day at first sign of outbreak, Disp: 30 tablet, Rfl: 5    Allergies   Allergen Reactions    Amoxicillin Drowsiness    Cefdinir Other (See Comments)     Eyelid twitching       Penicillins        Social History   Past Surgical History:   Procedure Laterality Date    APPENDECTOMY      WISDOM TOOTH EXTRACTION       Family History   Problem Relation Age of Onset    Hypertension Mother     Anxiety disorder Mother     Irregular heart beat Mother         pacemaker    Colon polyps Mother     Diabetes Maternal Grandfather     No Known Problems Father        Objective:  /82 (BP Location: Left arm, Patient Position: Sitting, Cuff Size: Standard)   Pulse 65   Temp 97.9 °F (36.6 °C) (Temporal)   Ht 6' 2\" (1.88 m)   Wt 115 kg (254 lb 6.4 oz)   SpO2 99%   BMI 32.66 kg/m²      Physical Exam  Vitals reviewed.   Constitutional:       General: He is not in acute distress.     Appearance: Normal appearance. He is not diaphoretic.   HENT:      Head: Normocephalic and atraumatic.   Eyes:      Extraocular Movements: Extraocular movements intact.      Conjunctiva/sclera: Conjunctivae normal.      Pupils: Pupils are equal, round, and reactive to light.   Cardiovascular:      Rate and Rhythm: Normal rate and regular rhythm.      Heart sounds: Normal heart sounds. No murmur heard.  Pulmonary:      Effort: Pulmonary effort is normal. No respiratory distress.      Breath sounds: Normal breath sounds. No wheezing, rhonchi or rales.   Chest:      Chest wall: No " tenderness.   Neurological:      Mental Status: He is alert and oriented to person, place, and time. Mental status is at baseline.   Psychiatric:         Mood and Affect: Mood normal.         Behavior: Behavior normal.         Thought Content: Thought content normal.         Judgment: Judgment normal.

## 2024-02-15 DIAGNOSIS — I10 ESSENTIAL HYPERTENSION: ICD-10-CM

## 2024-02-15 RX ORDER — AMLODIPINE BESYLATE 5 MG/1
TABLET ORAL
Qty: 90 TABLET | Refills: 0 | Status: SHIPPED | OUTPATIENT
Start: 2024-02-15

## 2024-03-01 ENCOUNTER — OFFICE VISIT (OUTPATIENT)
Dept: CARDIOLOGY CLINIC | Facility: CLINIC | Age: 49
End: 2024-03-01
Payer: COMMERCIAL

## 2024-03-01 VITALS
OXYGEN SATURATION: 97 % | SYSTOLIC BLOOD PRESSURE: 122 MMHG | WEIGHT: 243 LBS | DIASTOLIC BLOOD PRESSURE: 84 MMHG | BODY MASS INDEX: 31.2 KG/M2 | HEART RATE: 58 BPM

## 2024-03-01 DIAGNOSIS — I10 PRIMARY HYPERTENSION: Primary | ICD-10-CM

## 2024-03-01 DIAGNOSIS — R07.9 CHEST PAIN SYNDROME: ICD-10-CM

## 2024-03-01 PROCEDURE — 99212 OFFICE O/P EST SF 10 MIN: CPT | Performed by: INTERNAL MEDICINE

## 2024-03-01 PROCEDURE — 93000 ELECTROCARDIOGRAM COMPLETE: CPT | Performed by: INTERNAL MEDICINE

## 2024-03-01 NOTE — PROGRESS NOTES
Cardiology Follow Up    Nino Moreira  1975  [id number]  [facility] CARDIAC CATH LAB  [address] [facility] [address] [facility] [address]  [phone]    1. Primary hypertension        2. Chest pain syndrome  POCT ECG          Interval History: Cardiology follow-up.  Patient is clinically stable.  He continues to experience occasional right-sided chest discomfort.  No radiation, he has difficulty describing it.   no pleurisy, no radiation.  Nonexertional in nature.  This is a somewhat chronic problem, no crescendo pattern.    Patient Active Problem List   Diagnosis    Large liver    Hypertension    Increased frequency of urination    Herpes labialis    Varicocele    Hydrocele in adult    Lower GI bleeding    Presbycusis of both ears    Weak urinary stream    Anxiety    Post-void dribbling    Incomplete bladder emptying    Hearing trouble, bilateral    Chest pain syndrome     Past Medical History:   Diagnosis Date    Hypertension 3/23/2015     Social History     Socioeconomic History    Marital status: /Civil Union     Spouse name: Not on file    Number of children: Not on file    Years of education: Not on file    Highest education level: Not on file   Occupational History    Not on file   Tobacco Use    Smoking status: Some Days     Types: Cigars     Start date: 2002    Smokeless tobacco: Former     Quit date: 2021    Tobacco comments:     occasional cigar   Vaping Use    Vaping status: Some Days    Substances: THC   Substance and Sexual Activity    Alcohol use: Yes     Comment: 25 drinks per week - IPA beer and whisky    Drug use: Yes     Types: Other, Marijuana     Comment: Vapes THC    Sexual activity: Not Currently     Partners: Female   Other Topics Concern    Not on file   Social History Narrative    Not on file     Social Determinants of Health     Financial Resource Strain: Not on file   Food Insecurity: Not on file   Transportation  Needs: Not on file   Physical Activity: Not on file   Stress: Not on file   Social Connections: Not on file   Intimate Partner Violence: Not on file   Housing Stability: Not on file      Family History   Problem Relation Age of Onset    Hypertension Mother     Anxiety disorder Mother     Irregular heart beat Mother         pacemaker    Colon polyps Mother     Diabetes Maternal Grandfather     No Known Problems Father      Past Surgical History:   Procedure Laterality Date    APPENDECTOMY      WISDOM TOOTH EXTRACTION         Current Outpatient Medications:     amLODIPine (NORVASC) 5 mg tablet, TAKE 1 TABLET DAILY, Disp: 90 tablet, Rfl: 0    escitalopram (Lexapro) 5 mg tablet, Take 1 tablet (5 mg total) by mouth daily, Disp: 90 tablet, Rfl: 1    Sodium Fluoride 5000 PPM 1.1 % PSTE, BRUSH TWICE DAILY IN MORNING AND EVENING. DO NOT RINSE, Disp: , Rfl:     valACYclovir (VALTREX) 1,000 mg tablet, Take 2 tablets twice a day for 1 day at first sign of outbreak, Disp: 30 tablet, Rfl: 5  Allergies   Allergen Reactions    Amoxicillin Drowsiness    Cefdinir Other (See Comments)     Eyelid twitching       Penicillins        Labs:  No visits with results within 6 Month(s) from this visit.   Latest known visit with results is:   Office Visit on 11/01/2022   Component Date Value    LEUKOCYTE ESTERASE,UA 11/01/2022 trace     NITRITE,UA 11/01/2022 -     SL AMB POCT UROBILINOGEN 11/01/2022 0.2     POCT URINE PROTEIN 11/01/2022 -      PH,UA 11/01/2022 6.5     BLOOD,UA 11/01/2022 non-hemo trace     SPECIFIC GRAVITY,UA 11/01/2022 1.025     KETONES,UA 11/01/2022 trace     BILIRUBIN,UA 11/01/2022 small     GLUCOSE, UA 11/01/2022 -      COLOR,UA 11/01/2022 shivani     CLARITY,UA 11/01/2022 clear     POST-VOID RESIDUAL VOLUM* 11/01/2022 0      Imaging: No results found.    Review of Systems:  Review of Systems   Constitutional:  Negative for activity change and fatigue.   Respiratory:  Negative for apnea, shortness of breath, wheezing and  stridor.    Cardiovascular:  Positive for chest pain. Negative for palpitations and leg swelling.   Psychiatric/Behavioral:  Negative for sleep disturbance.        Physical Exam:  Physical Exam  Vitals reviewed.   Constitutional:       General: He is not in acute distress.     Appearance: Normal appearance. He is not ill-appearing, toxic-appearing or diaphoretic.   Neck:      Vascular: No carotid bruit.   Cardiovascular:      Rate and Rhythm: Normal rate and regular rhythm.      Heart sounds: Normal heart sounds. No murmur heard.     No friction rub. No gallop.   Pulmonary:      Effort: Pulmonary effort is normal. No respiratory distress.      Breath sounds: Normal breath sounds. No stridor. No wheezing, rhonchi or rales.   Chest:      Chest wall: No tenderness.   Neurological:      Mental Status: He is alert.   Psychiatric:         Mood and Affect: Mood normal.         Discussion/Summary: Hypertension essential, acceptable control, today is borderline elevated diastolic at 84.  I asked him to do ambulatory blood pressures.  In addition to low-sodium diet.  Chest discomfort is atypical.  He had a stress echocardiogram 2015 that was negative for ischemia at that time.  Renal artery duplex was also negative.  Will proceed with a regular stress test.    This note was completed in part utilizing [employer] direct voice recognition software.   Grammatical errors, random word insertion, spelling mistakes, and incomplete sentences may be an occasional consequence of the system secondary to software limitations, ambient noise and hardware issues. At the time of dictation, efforts were made to edit, clarify and /or correct errors.  Please read the chart carefully and recognize, using context, where substitutions have occurred.  If you have any questions or concerns about the context, text or information contained within the body of this dictation, please contact myself, the provider, for further clarification.

## 2024-03-08 ENCOUNTER — HOSPITAL ENCOUNTER (OUTPATIENT)
Dept: NON INVASIVE DIAGNOSTICS | Facility: CLINIC | Age: 49
Discharge: HOME | End: 2024-03-08
Payer: COMMERCIAL

## 2024-03-08 VITALS
DIASTOLIC BLOOD PRESSURE: 92 MMHG | SYSTOLIC BLOOD PRESSURE: 142 MMHG | WEIGHT: 243 LBS | HEART RATE: 64 BPM | BODY MASS INDEX: 31.18 KG/M2 | HEIGHT: 74 IN | OXYGEN SATURATION: 98 %

## 2024-03-08 DIAGNOSIS — R07.9 CHEST PAIN SYNDROME: ICD-10-CM

## 2024-03-08 DIAGNOSIS — I10 PRIMARY HYPERTENSION: ICD-10-CM

## 2024-03-08 LAB
CHEST PAIN STATEMENT: NORMAL
MAX DIASTOLIC BP: 90 MMHG
MAX HR PERCENT: 93 %
MAX HR: 162 BPM
MAX PREDICTED HEART RATE: 174 BPM
PROTOCOL NAME: NORMAL
RATE PRESSURE PRODUCT: NORMAL
REASON FOR TERMINATION: NORMAL
SL CV STRESS RECOVERY BP: NORMAL MMHG
SL CV STRESS RECOVERY HR: 92 BPM
SL CV STRESS RECOVERY O2 SAT: 99 %
SL CV STRESS STAGE REACHED: 4
STRESS ANGINA INDEX: 0
STRESS BASELINE BP: NORMAL MMHG
STRESS BASELINE HR: 64 BPM
STRESS O2 SAT REST: 98 %
STRESS PEAK HR: 162 BPM
STRESS POST ESTIMATED WORKLOAD: 13.4 METS
STRESS POST EXERCISE DUR MIN: 10 MIN
STRESS POST EXERCISE DUR MIN: 10 MIN
STRESS POST EXERCISE DUR SEC: 30 SEC
STRESS POST EXERCISE DUR SEC: 30 SEC
STRESS POST O2 SAT PEAK: 96 %
STRESS POST PEAK BP: 158 MMHG
STRESS POST PEAK HR: 162 BPM
STRESS POST PEAK SYSTOLIC BP: 164 MMHG
TARGET HR FORMULA: NORMAL
TEST INDICATION: NORMAL

## 2024-03-08 PROCEDURE — 93016 CV STRESS TEST SUPVJ ONLY: CPT | Performed by: INTERNAL MEDICINE

## 2024-03-08 PROCEDURE — 93017 CV STRESS TEST TRACING ONLY: CPT

## 2024-03-08 PROCEDURE — 93018 CV STRESS TEST I&R ONLY: CPT | Performed by: INTERNAL MEDICINE

## 2024-03-11 LAB
CHEST PAIN STATEMENT: NORMAL
MAX DIASTOLIC BP: 90 MMHG
MAX PREDICTED HEART RATE: 174 BPM
PROTOCOL NAME: NORMAL
REASON FOR TERMINATION: NORMAL
STRESS POST EXERCISE DUR MIN: 10 MIN
STRESS POST EXERCISE DUR SEC: 30 SEC
STRESS POST PEAK HR: 162 BPM
STRESS POST PEAK SYSTOLIC BP: 164 MMHG
TARGET HR FORMULA: NORMAL
TEST INDICATION: NORMAL

## 2024-05-20 DIAGNOSIS — I10 ESSENTIAL HYPERTENSION: ICD-10-CM

## 2024-05-21 RX ORDER — AMLODIPINE BESYLATE 5 MG/1
TABLET ORAL
Qty: 90 TABLET | Refills: 1 | Status: SHIPPED | OUTPATIENT
Start: 2024-05-21

## 2024-06-20 ENCOUNTER — RA CDI HCC (OUTPATIENT)
Dept: OTHER | Facility: HOSPITAL | Age: 49
End: 2024-06-20

## 2024-07-20 DIAGNOSIS — F41.9 ANXIETY: ICD-10-CM

## 2024-07-20 RX ORDER — ESCITALOPRAM OXALATE 5 MG/1
5 TABLET ORAL DAILY
Qty: 90 TABLET | Refills: 0 | Status: SHIPPED | OUTPATIENT
Start: 2024-07-20

## 2024-08-01 ENCOUNTER — OFFICE VISIT (OUTPATIENT)
Dept: INTERNAL MEDICINE CLINIC | Facility: OTHER | Age: 49
End: 2024-08-01
Payer: COMMERCIAL

## 2024-08-01 VITALS
TEMPERATURE: 98.5 F | HEIGHT: 74 IN | BODY MASS INDEX: 29.88 KG/M2 | OXYGEN SATURATION: 97 % | HEART RATE: 62 BPM | DIASTOLIC BLOOD PRESSURE: 82 MMHG | SYSTOLIC BLOOD PRESSURE: 120 MMHG | WEIGHT: 232.8 LBS

## 2024-08-01 DIAGNOSIS — I10 PRIMARY HYPERTENSION: ICD-10-CM

## 2024-08-01 DIAGNOSIS — R16.0 ENLARGED LIVER: ICD-10-CM

## 2024-08-01 DIAGNOSIS — F41.9 ANXIETY: Primary | ICD-10-CM

## 2024-08-01 DIAGNOSIS — Z13.220 SCREENING FOR LIPID DISORDERS: ICD-10-CM

## 2024-08-01 DIAGNOSIS — R16.0 LARGE LIVER: ICD-10-CM

## 2024-08-01 DIAGNOSIS — R53.83 OTHER FATIGUE: ICD-10-CM

## 2024-08-01 DIAGNOSIS — R07.9 CHEST PAIN SYNDROME: ICD-10-CM

## 2024-08-01 PROCEDURE — 99214 OFFICE O/P EST MOD 30 MIN: CPT | Performed by: NURSE PRACTITIONER

## 2024-08-01 RX ORDER — ESCITALOPRAM OXALATE 10 MG/1
10 TABLET ORAL DAILY
Qty: 90 TABLET | Refills: 1 | Status: SHIPPED | OUTPATIENT
Start: 2024-08-01

## 2024-08-01 NOTE — ASSESSMENT & PLAN NOTE
-Chest pain currently resolved  -Following with cardiology.  Exercise stress test in March 2024 was normal.   -Plan to follow-up with cardiology every 2 years

## 2024-08-01 NOTE — PROGRESS NOTES
Assessment/Plan:    Problem List Items Addressed This Visit       Large liver     History of enlarged liver seen on CT from 2015  Liver enzymes previously normal  Check CMP  Check liver ultrasound         Hypertension     Controlled on amlodipine 5 mg daily         Relevant Orders    Comprehensive metabolic panel    Anxiety - Primary     -Increase Lexapro to 10 mg daily  -Patient states he does not plan to be on this medication forever.  We discussed the importance of not stopping abruptly, but weaning off when he is ready.  He will notify office if he feels that he would like to start to wean off the medication  -Follow-up 6 months         Relevant Medications    escitalopram (LEXAPRO) 10 mg tablet    Other Relevant Orders    TSH, 3rd generation with Free T4 reflex    Chest pain syndrome     -Chest pain currently resolved  -Following with cardiology.  Exercise stress test in March 2024 was normal.   -Plan to follow-up with cardiology every 2 years          Other Visit Diagnoses       Enlarged liver        Relevant Orders    US right upper quadrant    Comprehensive metabolic panel    Other fatigue        Relevant Orders    CBC and differential    TSH, 3rd generation with Free T4 reflex    Screening for lipid disorders        Relevant Orders    Lipid Panel with Direct LDL reflex            M*Modal software was used to dictate this note.  It may contain errors with dictating incorrect words or incorrect spelling. Please contact the provider directly with any questions.    Subjective:      Patient ID: Nino Moreira is a 48 y.o. male.    HPI    Patient presents today for 6 month follow up  He saw cardiology in March for chronic chest pain. Exercise stress test was normal. He was recommended to see them back again in 2 years.   He states he has been feeling more tired lately. He tells me he sleeps great and is very sluggish in the morning. Once he gets moving he states he feels okay. Symptoms will last about an hour  after waking up.   He has been mentally stressed and also doing a lot of physical exertion this summer.   He has cut back on alcohol which he feels is helping.     HTN- controlled on amlodipine 5mg daily    Anxiety-his anxiety has been a little worse lately and his wife feels that he should increase his Lexapro.  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  His oldest son recently turned 16 and is now teaching him to drive and him and his sons also hunt in the fall.  He feels that he has been overwhelmed with everything going on.  He denies any depression.     The following portions of the patient's history were reviewed and updated as appropriate: allergies, current medications, past family history, past medical history, past social history, past surgical history, and problem list.    Review of Systems   Constitutional:  Positive for fatigue.   Respiratory:  Negative for chest tightness and shortness of breath.    Cardiovascular:  Negative for chest pain.   Psychiatric/Behavioral:  Negative for dysphoric mood. The patient is nervous/anxious.          Past Medical History:   Diagnosis Date    Hypertension 3/23/2015         Current Outpatient Medications:     amLODIPine (NORVASC) 5 mg tablet, TAKE 1 TABLET DAILY, Disp: 90 tablet, Rfl: 1    escitalopram (LEXAPRO) 10 mg tablet, Take 1 tablet (10 mg total) by mouth daily, Disp: 90 tablet, Rfl: 1    Sodium Fluoride 5000 PPM 1.1 % PSTE, BRUSH TWICE DAILY IN MORNING AND EVENING. DO NOT RINSE, Disp: , Rfl:     valACYclovir (VALTREX) 1,000 mg tablet, Take 2 tablets twice a day for 1 day at first sign of outbreak, Disp: 30 tablet, Rfl: 5    Allergies   Allergen Reactions    Amoxicillin Drowsiness    Cefdinir Other (See Comments)     Eyelid twitching       Penicillins        Social History  "  Past Surgical History:   Procedure Laterality Date    APPENDECTOMY      WISDOM TOOTH EXTRACTION       Family History   Problem Relation Age of Onset    Hypertension Mother     Anxiety disorder Mother     Irregular heart beat Mother         pacemaker    Colon polyps Mother     Diabetes Maternal Grandfather     No Known Problems Father        Objective:  /82 (BP Location: Left arm, Patient Position: Sitting, Cuff Size: Large)   Pulse 62   Temp 98.5 °F (36.9 °C) (Temporal)   Ht 6' 2\" (1.88 m)   Wt 106 kg (232 lb 12.8 oz)   SpO2 97% Comment: ra  BMI 29.89 kg/m²      Physical Exam  Vitals reviewed.   Constitutional:       General: He is not in acute distress.     Appearance: Normal appearance. He is not diaphoretic.   HENT:      Head: Normocephalic and atraumatic.   Eyes:      Extraocular Movements: Extraocular movements intact.      Conjunctiva/sclera: Conjunctivae normal.      Pupils: Pupils are equal, round, and reactive to light.   Cardiovascular:      Rate and Rhythm: Normal rate and regular rhythm.      Heart sounds: Normal heart sounds. No murmur heard.  Pulmonary:      Effort: Pulmonary effort is normal. No respiratory distress.      Breath sounds: Normal breath sounds. No wheezing, rhonchi or rales.   Neurological:      Mental Status: He is alert and oriented to person, place, and time. Mental status is at baseline.   Psychiatric:         Mood and Affect: Mood normal.         Behavior: Behavior normal.         Thought Content: Thought content normal.         Judgment: Judgment normal.           "

## 2024-08-01 NOTE — ASSESSMENT & PLAN NOTE
History of enlarged liver seen on CT from 2015  Liver enzymes previously normal  Check CMP  Check liver ultrasound

## 2024-08-01 NOTE — ASSESSMENT & PLAN NOTE
-Increase Lexapro to 10 mg daily  -Patient states he does not plan to be on this medication forever.  We discussed the importance of not stopping abruptly, but weaning off when he is ready.  He will notify office if he feels that he would like to start to wean off the medication  -Follow-up 6 months

## 2024-11-11 DIAGNOSIS — I10 ESSENTIAL HYPERTENSION: ICD-10-CM

## 2024-11-12 RX ORDER — AMLODIPINE BESYLATE 5 MG/1
TABLET ORAL
Qty: 100 TABLET | Refills: 0 | Status: SHIPPED | OUTPATIENT
Start: 2024-11-12

## 2024-12-28 ENCOUNTER — APPOINTMENT (OUTPATIENT)
Dept: LAB | Facility: IMAGING CENTER | Age: 49
End: 2024-12-28
Payer: COMMERCIAL

## 2024-12-28 DIAGNOSIS — R16.0 ENLARGED LIVER: ICD-10-CM

## 2024-12-28 DIAGNOSIS — F41.9 ANXIETY: ICD-10-CM

## 2024-12-28 DIAGNOSIS — I10 PRIMARY HYPERTENSION: ICD-10-CM

## 2024-12-28 DIAGNOSIS — R53.83 OTHER FATIGUE: ICD-10-CM

## 2024-12-28 DIAGNOSIS — Z13.220 SCREENING FOR LIPID DISORDERS: ICD-10-CM

## 2024-12-28 LAB
ALBUMIN SERPL BCG-MCNC: 4.6 G/DL (ref 3.5–5)
ALP SERPL-CCNC: 47 U/L (ref 34–104)
ALT SERPL W P-5'-P-CCNC: 18 U/L (ref 7–52)
ANION GAP SERPL CALCULATED.3IONS-SCNC: 5 MMOL/L (ref 4–13)
AST SERPL W P-5'-P-CCNC: 20 U/L (ref 13–39)
BASOPHILS # BLD AUTO: 0.06 THOUSANDS/ÂΜL (ref 0–0.1)
BASOPHILS NFR BLD AUTO: 1 % (ref 0–1)
BILIRUB SERPL-MCNC: 0.97 MG/DL (ref 0.2–1)
BUN SERPL-MCNC: 21 MG/DL (ref 5–25)
CALCIUM SERPL-MCNC: 9.5 MG/DL (ref 8.4–10.2)
CHLORIDE SERPL-SCNC: 104 MMOL/L (ref 96–108)
CHOLEST SERPL-MCNC: 234 MG/DL (ref ?–200)
CO2 SERPL-SCNC: 29 MMOL/L (ref 21–32)
CREAT SERPL-MCNC: 1 MG/DL (ref 0.6–1.3)
EOSINOPHIL # BLD AUTO: 0.22 THOUSAND/ÂΜL (ref 0–0.61)
EOSINOPHIL NFR BLD AUTO: 4 % (ref 0–6)
ERYTHROCYTE [DISTWIDTH] IN BLOOD BY AUTOMATED COUNT: 12.2 % (ref 11.6–15.1)
GFR SERPL CREATININE-BSD FRML MDRD: 89 ML/MIN/1.73SQ M
GLUCOSE P FAST SERPL-MCNC: 95 MG/DL (ref 65–99)
HCT VFR BLD AUTO: 46.6 % (ref 36.5–49.3)
HDLC SERPL-MCNC: 64 MG/DL
HGB BLD-MCNC: 14.7 G/DL (ref 12–17)
IMM GRANULOCYTES # BLD AUTO: 0.01 THOUSAND/UL (ref 0–0.2)
IMM GRANULOCYTES NFR BLD AUTO: 0 % (ref 0–2)
LDLC SERPL CALC-MCNC: 145 MG/DL (ref 0–100)
LYMPHOCYTES # BLD AUTO: 1.63 THOUSANDS/ÂΜL (ref 0.6–4.47)
LYMPHOCYTES NFR BLD AUTO: 31 % (ref 14–44)
MCH RBC QN AUTO: 29.5 PG (ref 26.8–34.3)
MCHC RBC AUTO-ENTMCNC: 31.5 G/DL (ref 31.4–37.4)
MCV RBC AUTO: 94 FL (ref 82–98)
MONOCYTES # BLD AUTO: 0.63 THOUSAND/ÂΜL (ref 0.17–1.22)
MONOCYTES NFR BLD AUTO: 12 % (ref 4–12)
NEUTROPHILS # BLD AUTO: 2.74 THOUSANDS/ÂΜL (ref 1.85–7.62)
NEUTS SEG NFR BLD AUTO: 52 % (ref 43–75)
NRBC BLD AUTO-RTO: 0 /100 WBCS
PLATELET # BLD AUTO: 214 THOUSANDS/UL (ref 149–390)
PMV BLD AUTO: 10.5 FL (ref 8.9–12.7)
POTASSIUM SERPL-SCNC: 4.9 MMOL/L (ref 3.5–5.3)
PROT SERPL-MCNC: 7.6 G/DL (ref 6.4–8.4)
RBC # BLD AUTO: 4.98 MILLION/UL (ref 3.88–5.62)
SODIUM SERPL-SCNC: 138 MMOL/L (ref 135–147)
TRIGL SERPL-MCNC: 123 MG/DL (ref ?–150)
TSH SERPL DL<=0.05 MIU/L-ACNC: 1.25 UIU/ML (ref 0.45–4.5)
WBC # BLD AUTO: 5.29 THOUSAND/UL (ref 4.31–10.16)

## 2024-12-28 PROCEDURE — 80053 COMPREHEN METABOLIC PANEL: CPT

## 2024-12-28 PROCEDURE — 84443 ASSAY THYROID STIM HORMONE: CPT

## 2024-12-28 PROCEDURE — 36415 COLL VENOUS BLD VENIPUNCTURE: CPT

## 2024-12-28 PROCEDURE — 85025 COMPLETE CBC W/AUTO DIFF WBC: CPT

## 2024-12-28 PROCEDURE — 80061 LIPID PANEL: CPT

## 2025-01-31 ENCOUNTER — OFFICE VISIT (OUTPATIENT)
Dept: INTERNAL MEDICINE CLINIC | Facility: OTHER | Age: 50
End: 2025-01-31
Payer: COMMERCIAL

## 2025-01-31 VITALS
HEIGHT: 74 IN | HEART RATE: 77 BPM | BODY MASS INDEX: 29.77 KG/M2 | DIASTOLIC BLOOD PRESSURE: 84 MMHG | TEMPERATURE: 98.1 F | OXYGEN SATURATION: 98 % | SYSTOLIC BLOOD PRESSURE: 126 MMHG | WEIGHT: 232 LBS

## 2025-01-31 DIAGNOSIS — I10 PRIMARY HYPERTENSION: Primary | ICD-10-CM

## 2025-01-31 DIAGNOSIS — R16.0 LARGE LIVER: ICD-10-CM

## 2025-01-31 DIAGNOSIS — F41.9 ANXIETY: ICD-10-CM

## 2025-01-31 DIAGNOSIS — E78.00 PURE HYPERCHOLESTEROLEMIA: ICD-10-CM

## 2025-01-31 DIAGNOSIS — Z23 ENCOUNTER FOR IMMUNIZATION: ICD-10-CM

## 2025-01-31 DIAGNOSIS — J00 COMMON COLD: ICD-10-CM

## 2025-01-31 PROCEDURE — 90656 IIV3 VACC NO PRSV 0.5 ML IM: CPT

## 2025-01-31 PROCEDURE — 99214 OFFICE O/P EST MOD 30 MIN: CPT | Performed by: NURSE PRACTITIONER

## 2025-01-31 PROCEDURE — 90471 IMMUNIZATION ADMIN: CPT

## 2025-01-31 NOTE — PATIENT INSTRUCTIONS
Use mucinex DM for cough  Flonase as needed for rhinorrhea and post nasal drip   Tylenol or ibuprofen for body aches

## 2025-01-31 NOTE — PROGRESS NOTES
Name: Nino Moreira      : 1975      MRN: [id number]  Encounter Provider: PRIMO Calderón  Encounter Date: 2025   Encounter department: [facility]  :  Assessment & Plan  Primary hypertension  Controlled on amlodipine 5 mg daily  Continue current regimen       Anxiety  Mood controlled on Lexapro 10 mg daily  Continue current regimen       Large liver  Scheduled for follow-up liver ultrasound  Encouraged low-fat diet  Cessation of alcohol  Liver enzymes normal    Orders:    Comprehensive metabolic panel; Future    Pure hypercholesterolemia  ASCVD risk 6.8%  Discussed diet modifications with patient and continue routine exercise  Repeat in 6 months  Encouraged smoking cessation (smokes cigars occasionally)  Orders:    Lipid Panel with Direct LDL reflex; Future    Common cold  Use mucinex DM for cough  Flonase as needed for rhinorrhea and post nasal drip   Tylenol or ibuprofen for body aches   Avoid decongestants with history of hypertension              History of Present Illness   HPI  Patient presents today for routine 6-month follow-up  Labs completed   CBC and CMP normal  TSH normal  Total cholesterol 234, triglycerides 123, HDL 64,   The 10-year ASCVD risk score (Kristel MRATÍNEZ, et al., 2019) is: 6.8%    Values used to calculate the score:      Age: 49 years      Sex: Male      Is Non- : No      Diabetic: No      Tobacco smoker: Yes      Systolic Blood Pressure: 126 mmHg      Is BP treated: Yes      HDL Cholesterol: 64 mg/dL      Total Cholesterol: 234 mg/dL    He states Monday he started with a cough which has been ongoing all week. He says his wife and son have been sick for over a month. Son is improving. He states he took cold medicine this morning and did feel a little better.     Hypertension-controlled on amlodipine 5 mg daily    Anxiety-controlled on Lexapro 10 mg daily    Review of Systems   Constitutional:   "Negative for chills and fever.   HENT:  Positive for congestion, postnasal drip and rhinorrhea. Negative for sinus pressure, sinus pain and sore throat.    Respiratory:  Positive for cough. Negative for chest tightness, shortness of breath and wheezing.    Musculoskeletal:  Positive for myalgias.       Objective   /84 (BP Location: Left arm, Patient Position: Sitting, Cuff Size: Standard)   Pulse 77   Temp 98.1 °F (36.7 °C) (Temporal)   Ht 6' 2\" (1.88 m)   Wt 105 kg (232 lb)   SpO2 98%   BMI 29.79 kg/m²      Physical Exam  Vitals reviewed.   Constitutional:       General: He is not in acute distress.     Appearance: Normal appearance. He is obese. He is not diaphoretic.   HENT:      Head: Normocephalic and atraumatic.      Right Ear: Tympanic membrane and external ear normal.      Left Ear: Tympanic membrane and external ear normal.      Nose: Congestion present.      Right Sinus: No maxillary sinus tenderness or frontal sinus tenderness.      Left Sinus: No maxillary sinus tenderness or frontal sinus tenderness.      Mouth/Throat:      Mouth: Mucous membranes are moist.      Pharynx: Oropharynx is clear. No oropharyngeal exudate or posterior oropharyngeal erythema.   Eyes:      Extraocular Movements: Extraocular movements intact.      Conjunctiva/sclera: Conjunctivae normal.      Pupils: Pupils are equal, round, and reactive to light.   Cardiovascular:      Rate and Rhythm: Normal rate and regular rhythm.      Heart sounds: Normal heart sounds. No murmur heard.  Pulmonary:      Effort: Pulmonary effort is normal. No respiratory distress.      Breath sounds: Normal breath sounds. No wheezing, rhonchi or rales.      Comments: No cough during exam  Neurological:      Mental Status: He is alert and oriented to person, place, and time. Mental status is at baseline.   Psychiatric:         Mood and Affect: Mood normal.         Behavior: Behavior normal.         Thought Content: Thought content normal.         " Judgment: Judgment normal.

## 2025-01-31 NOTE — ASSESSMENT & PLAN NOTE
Controlled on amlodipine 5 mg daily  Continue current regimen       
Mood controlled on Lexapro 10 mg daily  Continue current regimen       
Scheduled for follow-up liver ultrasound  Encouraged low-fat diet  Cessation of alcohol  Liver enzymes normal    Orders:    Comprehensive metabolic panel; Future    
Use mucinex DM for cough  Flonase as needed for rhinorrhea and post nasal drip   Tylenol or ibuprofen for body aches   Avoid decongestants with history of hypertension       
hernia

## 2025-02-06 DIAGNOSIS — F41.9 ANXIETY: ICD-10-CM

## 2025-02-06 RX ORDER — ESCITALOPRAM OXALATE 10 MG/1
10 TABLET ORAL DAILY
Qty: 30 TABLET | Refills: 5 | Status: SHIPPED | OUTPATIENT
Start: 2025-02-06

## 2025-02-10 DIAGNOSIS — I10 ESSENTIAL HYPERTENSION: ICD-10-CM

## 2025-02-18 ENCOUNTER — HOSPITAL ENCOUNTER (OUTPATIENT)
Dept: RADIOLOGY | Facility: IMAGING CENTER | Age: 50
Discharge: HOME | End: 2025-02-18
Payer: COMMERCIAL

## 2025-02-18 DIAGNOSIS — R16.0 ENLARGED LIVER: ICD-10-CM

## 2025-02-18 PROCEDURE — 76705 ECHO EXAM OF ABDOMEN: CPT

## 2025-02-25 RX ORDER — AMLODIPINE BESYLATE 5 MG/1
5 TABLET ORAL DAILY
Qty: 90 TABLET | Refills: 3 | Status: SHIPPED | OUTPATIENT
Start: 2025-02-25

## 2025-02-25 NOTE — TELEPHONE ENCOUNTER
Caller: Nino Moreira    Doctor: Dr. Romano    Call back #: [phone]    Reason for call: Patient scheduled his 2 year follow up appointment with Dr. Romano. Patient stated he needs this medication refilled.

## 2025-03-02 PROBLEM — J00 COMMON COLD: Status: RESOLVED | Noted: 2017-01-24 | Resolved: 2025-03-02

## 2025-03-06 ENCOUNTER — RESULTS FOLLOW-UP (OUTPATIENT)
Dept: INTERNAL MEDICINE CLINIC | Facility: OTHER | Age: 50
End: 2025-03-06

## 2025-05-19 DIAGNOSIS — B00.1 HERPES LABIALIS: ICD-10-CM

## 2025-05-19 RX ORDER — VALACYCLOVIR HYDROCHLORIDE 1 G/1
TABLET, FILM COATED ORAL
Qty: 30 TABLET | Refills: 0 | Status: CANCELLED | OUTPATIENT
Start: 2025-05-19 | End: 2026-01-22

## 2025-05-19 NOTE — TELEPHONE ENCOUNTER
Patient is due for 6mo follow-up in July.     Message sent to schedule in order to move forward with VALACYCLOVIR refill request.

## 2025-07-17 ENCOUNTER — OFFICE VISIT (OUTPATIENT)
Dept: INTERNAL MEDICINE CLINIC | Facility: OTHER | Age: 50
End: 2025-07-17
Payer: COMMERCIAL

## 2025-07-17 VITALS
OXYGEN SATURATION: 98 % | BODY MASS INDEX: 30.54 KG/M2 | DIASTOLIC BLOOD PRESSURE: 82 MMHG | WEIGHT: 238 LBS | HEART RATE: 67 BPM | TEMPERATURE: 96.4 F | SYSTOLIC BLOOD PRESSURE: 110 MMHG | HEIGHT: 74 IN

## 2025-07-17 DIAGNOSIS — I10 PRIMARY HYPERTENSION: ICD-10-CM

## 2025-07-17 DIAGNOSIS — Z00.00 ANNUAL PHYSICAL EXAM: Primary | ICD-10-CM

## 2025-07-17 DIAGNOSIS — M25.561 ACUTE PAIN OF RIGHT KNEE: ICD-10-CM

## 2025-07-17 DIAGNOSIS — K76.0 FATTY LIVER: ICD-10-CM

## 2025-07-17 DIAGNOSIS — B00.1 HERPES LABIALIS: ICD-10-CM

## 2025-07-17 DIAGNOSIS — F41.9 ANXIETY: ICD-10-CM

## 2025-07-17 PROCEDURE — 99214 OFFICE O/P EST MOD 30 MIN: CPT | Performed by: NURSE PRACTITIONER

## 2025-07-17 PROCEDURE — 99396 PREV VISIT EST AGE 40-64: CPT | Performed by: NURSE PRACTITIONER

## 2025-07-17 RX ORDER — VALACYCLOVIR HYDROCHLORIDE 1 G/1
TABLET, FILM COATED ORAL
Qty: 30 TABLET | Refills: 1 | Status: SHIPPED | OUTPATIENT
Start: 2025-07-17 | End: 2026-03-22

## 2025-07-17 RX ORDER — ESCITALOPRAM OXALATE 5 MG/1
5 TABLET ORAL DAILY
Start: 2025-07-17

## 2025-07-17 RX ORDER — ESCITALOPRAM OXALATE 10 MG/1
5 TABLET ORAL DAILY
Start: 2025-07-17 | End: 2025-07-17 | Stop reason: SDUPTHER

## 2025-07-17 NOTE — PATIENT INSTRUCTIONS
"Patient Education     Routine physical for adults   The Basics   Written by the doctors and editors at [facility]   What is a physical? -- A physical is a routine visit, or \"check-up,\" with your doctor. You might also hear it called a \"wellness visit\" or \"preventive visit.\"  During each visit, the doctor will:   Ask about your physical and mental health   Ask about your habits, behaviors, and lifestyle   Do an exam   Give you vaccines if needed   Talk to you about any medicines you take   Give advice about your health   Answer your questions  Getting regular check-ups is an important part of taking care of your health. It can help your doctor find and treat any problems you have. But it's also important for preventing health problems.  A routine physical is different from a \"sick visit.\" A sick visit is when you see a doctor because of a health concern or problem. Since physicals are scheduled ahead of time, you can think about what you want to ask the doctor.  How often should I get a physical? -- It depends on your age and health. In general, for people age 21 years and older:   If you are younger than 50 years, you might be able to get a physical every 3 years.   If you are 50 years or older, your doctor might recommend a physical every year.  If you have an ongoing health condition, like diabetes or high blood pressure, your doctor will probably want to see you more often.  What happens during a physical? -- In general, each visit will include:   Physical exam - The doctor or nurse will check your height, weight, heart rate, and blood pressure. They will also look at your eyes and ears. They will ask about how you are feeling and whether you have any symptoms that bother you.   Medicines - It's a good idea to bring a list of all the medicines you take to each doctor visit. Your doctor will talk to you about your medicines and answer any questions. Tell them if you are having any side effects that bother you. You " "should also tell them if you are having trouble paying for any of your medicines.   Habits and behaviors - This includes:   Your diet   Your exercise habits   Whether you smoke, drink alcohol, or use drugs   Whether you are sexually active   Whether you feel safe at home  Your doctor will talk to you about things you can do to improve your health and lower your risk of health problems. They will also offer help and support. For example, if you want to quit smoking, they can give you advice and might prescribe medicines. If you want to improve your diet or get more physical activity, they can help you with this, too.   Lab tests, if needed - The tests you get will depend on your age and situation. For example, your doctor might want to check your:   Cholesterol   Blood sugar   Iron level   Vaccines - The recommended vaccines will depend on your age, health, and what vaccines you already had. Vaccines are very important because they can prevent certain serious or deadly infections.   Discussion of screening - \"Screening\" means checking for diseases or other health problems before they cause symptoms. Your doctor can recommend screening based on your age, risk, and preferences. This might include tests to check for:   Cancer, such as breast, prostate, cervical, ovarian, colorectal, prostate, lung, or skin cancer   Sexually transmitted infections, such as chlamydia and gonorrhea   Mental health conditions like depression and anxiety  Your doctor will talk to you about the different types of screening tests. They can help you decide which screenings to have. They can also explain what the results might mean.   Answering questions - The physical is a good time to ask the doctor or nurse questions about your health. If needed, they can refer you to other doctors or specialists, too.  Adults older than 65 years often need other care, too. As you get older, your doctor will talk to you about:   How to prevent falling at " home   Hearing or vision tests   Memory testing   How to take your medicines safely   Making sure that you have the help and support you need at home  All topics are updated as new evidence becomes available and our peer review process is complete.  This topic retrieved from [employer] on: May 02, 2024.  Topic 660005 Version 1.0  Release: 32.4.3 - C32.122  © 2024 UpToDate, Inc. and/or its affiliates. All rights reserved.  Consumer Information Use and Disclaimer   Disclaimer: This generalized information is a limited summary of diagnosis, treatment, and/or medication information. It is not meant to be comprehensive and should be used as a tool to help the user understand and/or assess potential diagnostic and treatment options. It does NOT include all information about conditions, treatments, medications, side effects, or risks that may apply to a specific patient. It is not intended to be medical advice or a substitute for the medical advice, diagnosis, or treatment of a health care provider based on the health care provider's examination and assessment of a patient's specific and unique circumstances. Patients must speak with a health care provider for complete information about their health, medical questions, and treatment options, including any risks or benefits regarding use of medications. This information does not endorse any treatments or medications as safe, effective, or approved for treating a specific patient. UpToDate, Inc. and its affiliates disclaim any warranty or liability relating to this information or the use thereof.The use of this information is governed by the Terms of Use, available at https://www.woltersfluid Operationsuwer.com/en/know/clinical-effectiveness-terms. 2024© UpToDate, Inc. and its affiliates and/or licensors. All rights reserved.  Copyright   © 2024 Up[employer]Date, Inc. and/or its affiliates. All rights reserved.

## 2025-07-17 NOTE — ASSESSMENT & PLAN NOTE
Continue Lexapro 5 mg daily  Follow-up in 6 months  Orders:    escitalopram (LEXAPRO) 5 mg tablet; Take 1 tablet (5 mg total) by mouth daily

## 2025-07-17 NOTE — ASSESSMENT & PLAN NOTE
Uses Valtrex as needed  Orders:    valACYclovir (VALTREX) 1,000 mg tablet; Take 2 tablets twice a day for 1 day at first sign of outbreak

## 2025-07-17 NOTE — PROGRESS NOTES
Adult Annual Physical  Name: Nino Moreira      : 1975      MRN: [id number]  Encounter Provider: PRIMO Calderón  Encounter Date: 2025   Encounter department: [facility]    :  Assessment & Plan  Annual physical exam  49-year-old male  Encouraged to continue healthy diet and routine exercise  Immunizations up-to-date  Encouraged monthly self testicular exams       Anxiety  Continue Lexapro 5 mg daily  Follow-up in 6 months  Orders:    escitalopram (LEXAPRO) 5 mg tablet; Take 1 tablet (5 mg total) by mouth daily    Herpes labialis  Uses Valtrex as needed  Orders:    valACYclovir (VALTREX) 1,000 mg tablet; Take 2 tablets twice a day for 1 day at first sign of outbreak    Fatty liver  Ultrasound completed 2025  LIVER:  Size: Mildly enlarged by strict ultrasound criteria. The liver measures 19.4 cm in the midclavicular line.  Contour: Smooth.  Parenchyma: Evidence of steatosis.  No liver mass identified.  Limited imaging of the main portal vein shows it to be patent and hepatopetal.    -Update CMP  - Recommend low-fat diet  - Limited alcohol intake  - Follow-up with GI      Orders:    Ambulatory Referral to Gastroenterology; Future    Acute pain of right knee  Start diclofenac gel  Rest, ice, elevate  Notify me if symptoms persist or worsen  Orders:    Diclofenac Sodium (VOLTAREN) 1 %; Apply 2 g topically 4 (four) times a day    Primary hypertension  Controlled on amlodipine 5 mg daily  Follow-up in 6 months           Preventive Screenings:  - Diabetes Screening: screening up-to-date  - Cholesterol Screening: screening not indicated and has hyperlipidemia   - Hepatitis C screening: screening up-to-date   - HIV screening: patient declines   - Colon cancer screening: screening up-to-date   - Lung cancer screening: screening not indicated   - Prostate cancer screening: screening not indicated     Immunizations:  - Immunizations due: Prevnar 20          History of Present Illness     Adult Annual Physical:  Patient presents for annual physical. Patient presents today for routine follow up and annual physical. 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     His RUQ US in Feb 2025 showed  LIVER:  Size: Mildly enlarged by strict ultrasound criteria. The liver measures 19.4 cm in the midclavicular line.  Contour: Smooth.  Parenchyma: Evidence of steatosis.  No liver mass identified.  Limited imaging of the main portal vein shows it to be patent and hepatopetal.    HTN-  he is on amlodipine 5mg daily. He is not monitoring his BP at home.     Anxiety - he is currently on lexapro 5mg daily. He feels his mood is well controlled.     He reports he has had aching in his patellar tendon for the last several months.     He also notes tingling of his right thumb when he outstretch his his right arm in a certain position.  Resolves with positional changes. .     Diet and Physical Activity:  - Diet/Nutrition: well balanced diet.  - Exercise: 3-4 times a week on average, strength training exercises and 1-2 hours on average.    Depression Screening:  - PHQ-2 Score: 0  - PHQ-9 Score: 0    General Health:  - Sleep: sleeps well and 7-8 hours of sleep on average.  - Hearing: decreased hearing bilateral ears.  - Vision: most recent eye exam > 1 year ago and wears glasses. Readers  - Dental: regular dental visits, brushes teeth twice daily, does not floss and brushes teeth once daily.     Health:  - History of STDs: no.   - Urinary symptoms: none.     Advanced Care Planning:  - Has an advanced directive?: no    - Has a durable medical POA?: no    - ACP document given to patient?: no      Review of Systems   Constitutional:  Negative for activity change, appetite change, chills, diaphoresis, fatigue, fever and unexpected weight change.   Eyes:  Negative for visual disturbance.         Objective   /82  "(BP Location: Left arm, Patient Position: Sitting, Cuff Size: Adult)   Pulse 67   Temp (!) 96.4 °F (35.8 °C) (Tympanic)   Ht 6' 2\" (1.88 m)   Wt 108 kg (238 lb)   SpO2 98%   BMI 30.56 kg/m²     Physical Exam  Vitals reviewed.   Constitutional:       General: He is not in acute distress.     Appearance: Normal appearance. He is well-developed. He is not diaphoretic.   HENT:      Head: Normocephalic and atraumatic.      Right Ear: Tympanic membrane and external ear normal.      Left Ear: Tympanic membrane and external ear normal.      Nose: Nose normal. No mucosal edema, congestion or rhinorrhea.      Mouth/Throat:      Mouth: Mucous membranes are moist.      Pharynx: Oropharynx is clear. No oropharyngeal exudate or posterior oropharyngeal erythema.     Eyes:      Extraocular Movements: Extraocular movements intact.      Conjunctiva/sclera: Conjunctivae normal.      Pupils: Pupils are equal, round, and reactive to light.     Neck:      Thyroid: No thyromegaly.      Vascular: No carotid bruit.     Cardiovascular:      Rate and Rhythm: Normal rate and regular rhythm.      Heart sounds: Normal heart sounds. No murmur heard.  Pulmonary:      Effort: Pulmonary effort is normal. No respiratory distress.      Breath sounds: Normal breath sounds. No decreased breath sounds, wheezing, rhonchi or rales.   Abdominal:      General: Bowel sounds are normal. There is no distension.      Palpations: Abdomen is soft. There is no mass.      Tenderness: There is no abdominal tenderness. There is no guarding.   Genitourinary:     Comments: Pt deferred  exam    Musculoskeletal:         General: No tenderness. Normal range of motion.      Cervical back: Normal range of motion and neck supple. No edema.   Lymphadenopathy:      Cervical: No cervical adenopathy.      Upper Body:      Right upper body: No supraclavicular, axillary, pectoral or epitrochlear adenopathy.      Left upper body: No supraclavicular, axillary, pectoral or " epitrochlear adenopathy.     Skin:     General: Skin is warm.      Findings: No rash.     Neurological:      Mental Status: He is alert and oriented to person, place, and time. Mental status is at baseline.      Motor: No weakness or abnormal muscle tone.      Gait: Gait normal.      Deep Tendon Reflexes: Reflexes are normal and symmetric.     Psychiatric:         Mood and Affect: Mood normal.         Behavior: Behavior normal.         Thought Content: Thought content normal.         Judgment: Judgment normal.